# Patient Record
Sex: FEMALE | Race: WHITE | Employment: UNEMPLOYED | URBAN - METROPOLITAN AREA
[De-identification: names, ages, dates, MRNs, and addresses within clinical notes are randomized per-mention and may not be internally consistent; named-entity substitution may affect disease eponyms.]

---

## 2019-03-04 PROBLEM — E66.01 SEVERE OBESITY (HCC): Status: ACTIVE | Noted: 2019-03-04

## 2019-05-14 ENCOUNTER — HOSPITAL ENCOUNTER (OUTPATIENT)
Dept: GENERAL RADIOLOGY | Age: 49
Discharge: HOME | End: 2019-05-14
Payer: MEDICARE

## 2019-05-14 DIAGNOSIS — M06.4 INFLAMMATORY POLYARTHRITIS (HCC): ICD-10-CM

## 2019-05-14 DIAGNOSIS — L40.9 PSORIASIS: ICD-10-CM

## 2019-05-14 DIAGNOSIS — Z79.52 LONG TERM (CURRENT) USE OF SYSTEMIC STEROIDS: ICD-10-CM

## 2019-05-14 DIAGNOSIS — L40.50 PSORIATIC ARTHRITIS (HCC): ICD-10-CM

## 2019-05-14 DIAGNOSIS — E55.9 HYPOVITAMINOSIS D: ICD-10-CM

## 2019-05-14 DIAGNOSIS — Z79.899 HIGH RISK MEDICATION USE: ICD-10-CM

## 2019-05-14 DIAGNOSIS — R53.83 FATIGUE, UNSPECIFIED TYPE: ICD-10-CM

## 2019-05-14 PROCEDURE — 73130 X-RAY EXAM OF HAND: CPT

## 2019-05-14 PROCEDURE — 73100 X-RAY EXAM OF WRIST: CPT

## 2019-09-03 PROBLEM — J44.9 CHRONIC OBSTRUCTIVE PULMONARY DISEASE (HCC): Status: ACTIVE | Noted: 2019-09-03

## 2019-09-03 PROBLEM — R21 RASH AND NONSPECIFIC SKIN ERUPTION: Status: ACTIVE | Noted: 2019-09-03

## 2019-09-03 PROBLEM — K42.9 RECURRENT UMBILICAL HERNIA: Status: ACTIVE | Noted: 2019-09-03

## 2019-09-05 PROBLEM — K42.9 UMBILICAL HERNIA WITHOUT OBSTRUCTION AND WITHOUT GANGRENE: Status: ACTIVE | Noted: 2019-09-05

## 2019-09-09 RX ORDER — SODIUM CHLORIDE 0.9 % (FLUSH) 0.9 %
5-40 SYRINGE (ML) INJECTION AS NEEDED
Status: CANCELLED | OUTPATIENT
Start: 2019-09-09

## 2019-09-09 RX ORDER — SODIUM CHLORIDE 0.9 % (FLUSH) 0.9 %
5-40 SYRINGE (ML) INJECTION EVERY 8 HOURS
Status: CANCELLED | OUTPATIENT
Start: 2019-09-09

## 2019-09-16 ENCOUNTER — ANESTHESIA EVENT (OUTPATIENT)
Dept: SURGERY | Age: 49
End: 2019-09-16
Payer: MEDICARE

## 2019-09-16 ENCOUNTER — ANESTHESIA (OUTPATIENT)
Dept: SURGERY | Age: 49
End: 2019-09-16
Payer: MEDICARE

## 2019-09-16 ENCOUNTER — HOSPITAL ENCOUNTER (OUTPATIENT)
Age: 49
Setting detail: OUTPATIENT SURGERY
Discharge: HOME | End: 2019-09-16
Attending: SURGERY | Admitting: SURGERY
Payer: MEDICARE

## 2019-09-16 VITALS
RESPIRATION RATE: 16 BRPM | TEMPERATURE: 98.4 F | DIASTOLIC BLOOD PRESSURE: 55 MMHG | WEIGHT: 196.2 LBS | HEIGHT: 63 IN | HEART RATE: 66 BPM | BODY MASS INDEX: 34.76 KG/M2 | SYSTOLIC BLOOD PRESSURE: 98 MMHG | OXYGEN SATURATION: 98 %

## 2019-09-16 LAB — HGB BLD-MCNC: 14.1 G/DL (ref 11.7–15.4)

## 2019-09-16 PROCEDURE — 76060000032 HC ANESTHESIA 0.5 TO 1 HR: Performed by: SURGERY

## 2019-09-16 PROCEDURE — 74011250637 HC RX REV CODE- 250/637: Performed by: ANESTHESIOLOGY

## 2019-09-16 PROCEDURE — 74011000250 HC RX REV CODE- 250

## 2019-09-16 PROCEDURE — 77030018836 HC SOL IRR NACL ICUM -A: Performed by: SURGERY

## 2019-09-16 PROCEDURE — 77030039425 HC BLD LARYNG TRULITE DISP TELE -A: Performed by: REGISTERED NURSE

## 2019-09-16 PROCEDURE — 76210000020 HC REC RM PH II FIRST 0.5 HR: Performed by: SURGERY

## 2019-09-16 PROCEDURE — 74011250636 HC RX REV CODE- 250/636: Performed by: ANESTHESIOLOGY

## 2019-09-16 PROCEDURE — 85018 HEMOGLOBIN: CPT

## 2019-09-16 PROCEDURE — 74011000250 HC RX REV CODE- 250: Performed by: SURGERY

## 2019-09-16 PROCEDURE — 77030019940 HC BLNKT HYPOTHRM STRY -B: Performed by: REGISTERED NURSE

## 2019-09-16 PROCEDURE — 74011250636 HC RX REV CODE- 250/636

## 2019-09-16 PROCEDURE — 77030002933 HC SUT MCRYL J&J -A: Performed by: SURGERY

## 2019-09-16 PROCEDURE — 74011250636 HC RX REV CODE- 250/636: Performed by: SURGERY

## 2019-09-16 PROCEDURE — 77030037088 HC TUBE ENDOTRACH ORAL NSL COVD-A: Performed by: REGISTERED NURSE

## 2019-09-16 PROCEDURE — 77030031139 HC SUT VCRL2 J&J -A: Performed by: SURGERY

## 2019-09-16 PROCEDURE — 76010000160 HC OR TIME 0.5 TO 1 HR INTENSV-TIER 1: Performed by: SURGERY

## 2019-09-16 PROCEDURE — 76210000063 HC OR PH I REC FIRST 0.5 HR: Performed by: SURGERY

## 2019-09-16 PROCEDURE — 77030039266 HC ADH SKN EXOFIN S2SG -A: Performed by: SURGERY

## 2019-09-16 PROCEDURE — 77030033269 HC SLV COMPR SCD KNE2 CARD -B: Performed by: SURGERY

## 2019-09-16 PROCEDURE — 74011250637 HC RX REV CODE- 250/637

## 2019-09-16 PROCEDURE — 77030002966 HC SUT PDS J&J -A: Performed by: SURGERY

## 2019-09-16 PROCEDURE — C1781 MESH (IMPLANTABLE): HCPCS | Performed by: SURGERY

## 2019-09-16 DEVICE — BARD VENTRALEX HERNIA PATCH, 4.3 CM (1.7"), SMALL CIRCLE WITH STRAP
Type: IMPLANTABLE DEVICE | Site: UMBILICAL | Status: FUNCTIONAL
Brand: VENTRALEX

## 2019-09-16 RX ORDER — PROPOFOL 10 MG/ML
INJECTION, EMULSION INTRAVENOUS AS NEEDED
Status: DISCONTINUED | OUTPATIENT
Start: 2019-09-16 | End: 2019-09-16 | Stop reason: HOSPADM

## 2019-09-16 RX ORDER — NALOXONE HYDROCHLORIDE 0.4 MG/ML
0.1 INJECTION, SOLUTION INTRAMUSCULAR; INTRAVENOUS; SUBCUTANEOUS
Status: DISCONTINUED | OUTPATIENT
Start: 2019-09-16 | End: 2019-09-16 | Stop reason: HOSPADM

## 2019-09-16 RX ORDER — HYDROMORPHONE HYDROCHLORIDE 2 MG/ML
0.5 INJECTION, SOLUTION INTRAMUSCULAR; INTRAVENOUS; SUBCUTANEOUS
Status: DISCONTINUED | OUTPATIENT
Start: 2019-09-16 | End: 2019-09-16 | Stop reason: HOSPADM

## 2019-09-16 RX ORDER — CEFAZOLIN SODIUM/WATER 2 G/20 ML
2 SYRINGE (ML) INTRAVENOUS ONCE
Status: COMPLETED | OUTPATIENT
Start: 2019-09-16 | End: 2019-09-16

## 2019-09-16 RX ORDER — DEXAMETHASONE SODIUM PHOSPHATE 4 MG/ML
INJECTION, SOLUTION INTRA-ARTICULAR; INTRALESIONAL; INTRAMUSCULAR; INTRAVENOUS; SOFT TISSUE AS NEEDED
Status: DISCONTINUED | OUTPATIENT
Start: 2019-09-16 | End: 2019-09-16 | Stop reason: HOSPADM

## 2019-09-16 RX ORDER — ROCURONIUM BROMIDE 10 MG/ML
INJECTION, SOLUTION INTRAVENOUS AS NEEDED
Status: DISCONTINUED | OUTPATIENT
Start: 2019-09-16 | End: 2019-09-16 | Stop reason: HOSPADM

## 2019-09-16 RX ORDER — OXYCODONE HYDROCHLORIDE 5 MG/1
10 TABLET ORAL
Status: DISCONTINUED | OUTPATIENT
Start: 2019-09-16 | End: 2019-09-16 | Stop reason: HOSPADM

## 2019-09-16 RX ORDER — ALBUTEROL SULFATE 90 UG/1
AEROSOL, METERED RESPIRATORY (INHALATION) AS NEEDED
Status: DISCONTINUED | OUTPATIENT
Start: 2019-09-16 | End: 2019-09-16 | Stop reason: HOSPADM

## 2019-09-16 RX ORDER — SODIUM CHLORIDE, SODIUM LACTATE, POTASSIUM CHLORIDE, CALCIUM CHLORIDE 600; 310; 30; 20 MG/100ML; MG/100ML; MG/100ML; MG/100ML
75 INJECTION, SOLUTION INTRAVENOUS CONTINUOUS
Status: DISCONTINUED | OUTPATIENT
Start: 2019-09-16 | End: 2019-09-16 | Stop reason: HOSPADM

## 2019-09-16 RX ORDER — BUPIVACAINE HYDROCHLORIDE AND EPINEPHRINE 5; 5 MG/ML; UG/ML
INJECTION, SOLUTION EPIDURAL; INTRACAUDAL; PERINEURAL AS NEEDED
Status: DISCONTINUED | OUTPATIENT
Start: 2019-09-16 | End: 2019-09-16 | Stop reason: HOSPADM

## 2019-09-16 RX ORDER — LIDOCAINE HYDROCHLORIDE 10 MG/ML
0.1 INJECTION INFILTRATION; PERINEURAL AS NEEDED
Status: DISCONTINUED | OUTPATIENT
Start: 2019-09-16 | End: 2019-09-16 | Stop reason: HOSPADM

## 2019-09-16 RX ORDER — SODIUM CHLORIDE 0.9 % (FLUSH) 0.9 %
5-40 SYRINGE (ML) INJECTION AS NEEDED
Status: DISCONTINUED | OUTPATIENT
Start: 2019-09-16 | End: 2019-09-16 | Stop reason: HOSPADM

## 2019-09-16 RX ORDER — DIPHENHYDRAMINE HYDROCHLORIDE 50 MG/ML
12.5 INJECTION, SOLUTION INTRAMUSCULAR; INTRAVENOUS
Status: DISCONTINUED | OUTPATIENT
Start: 2019-09-16 | End: 2019-09-16 | Stop reason: HOSPADM

## 2019-09-16 RX ORDER — ACETAMINOPHEN 500 MG
1000 TABLET ORAL ONCE
Status: COMPLETED | OUTPATIENT
Start: 2019-09-16 | End: 2019-09-16

## 2019-09-16 RX ORDER — FENTANYL CITRATE 50 UG/ML
INJECTION, SOLUTION INTRAMUSCULAR; INTRAVENOUS AS NEEDED
Status: DISCONTINUED | OUTPATIENT
Start: 2019-09-16 | End: 2019-09-16 | Stop reason: HOSPADM

## 2019-09-16 RX ORDER — MIDAZOLAM HYDROCHLORIDE 1 MG/ML
2 INJECTION, SOLUTION INTRAMUSCULAR; INTRAVENOUS
Status: COMPLETED | OUTPATIENT
Start: 2019-09-16 | End: 2019-09-16

## 2019-09-16 RX ORDER — NEOSTIGMINE METHYLSULFATE 1 MG/ML
INJECTION, SOLUTION INTRAVENOUS AS NEEDED
Status: DISCONTINUED | OUTPATIENT
Start: 2019-09-16 | End: 2019-09-16 | Stop reason: HOSPADM

## 2019-09-16 RX ORDER — ONDANSETRON 2 MG/ML
INJECTION INTRAMUSCULAR; INTRAVENOUS AS NEEDED
Status: DISCONTINUED | OUTPATIENT
Start: 2019-09-16 | End: 2019-09-16 | Stop reason: HOSPADM

## 2019-09-16 RX ORDER — OXYCODONE HYDROCHLORIDE 5 MG/1
5 TABLET ORAL
Status: COMPLETED | OUTPATIENT
Start: 2019-09-16 | End: 2019-09-16

## 2019-09-16 RX ORDER — GABAPENTIN 300 MG/1
600 CAPSULE ORAL ONCE
Status: DISCONTINUED | OUTPATIENT
Start: 2019-09-16 | End: 2019-09-16

## 2019-09-16 RX ORDER — GLYCOPYRROLATE 0.2 MG/ML
INJECTION INTRAMUSCULAR; INTRAVENOUS AS NEEDED
Status: DISCONTINUED | OUTPATIENT
Start: 2019-09-16 | End: 2019-09-16 | Stop reason: HOSPADM

## 2019-09-16 RX ORDER — FLUMAZENIL 0.1 MG/ML
0.2 INJECTION INTRAVENOUS AS NEEDED
Status: DISCONTINUED | OUTPATIENT
Start: 2019-09-16 | End: 2019-09-16 | Stop reason: HOSPADM

## 2019-09-16 RX ORDER — SODIUM CHLORIDE 0.9 % (FLUSH) 0.9 %
5-40 SYRINGE (ML) INJECTION EVERY 8 HOURS
Status: DISCONTINUED | OUTPATIENT
Start: 2019-09-16 | End: 2019-09-16 | Stop reason: HOSPADM

## 2019-09-16 RX ORDER — LIDOCAINE HYDROCHLORIDE 20 MG/ML
INJECTION, SOLUTION EPIDURAL; INFILTRATION; INTRACAUDAL; PERINEURAL AS NEEDED
Status: DISCONTINUED | OUTPATIENT
Start: 2019-09-16 | End: 2019-09-16 | Stop reason: HOSPADM

## 2019-09-16 RX ADMIN — OXYCODONE HYDROCHLORIDE 5 MG: 5 TABLET ORAL at 13:05

## 2019-09-16 RX ADMIN — NEOSTIGMINE METHYLSULFATE 4 MG: 1 INJECTION, SOLUTION INTRAVENOUS at 12:42

## 2019-09-16 RX ADMIN — DEXAMETHASONE SODIUM PHOSPHATE 10 MG: 4 INJECTION, SOLUTION INTRA-ARTICULAR; INTRALESIONAL; INTRAMUSCULAR; INTRAVENOUS; SOFT TISSUE at 12:21

## 2019-09-16 RX ADMIN — ONDANSETRON 4 MG: 2 INJECTION INTRAMUSCULAR; INTRAVENOUS at 12:21

## 2019-09-16 RX ADMIN — ACETAMINOPHEN 1000 MG: 500 TABLET, FILM COATED ORAL at 12:01

## 2019-09-16 RX ADMIN — MIDAZOLAM HYDROCHLORIDE 2 MG: 2 INJECTION, SOLUTION INTRAMUSCULAR; INTRAVENOUS at 12:01

## 2019-09-16 RX ADMIN — Medication 2 G: at 12:21

## 2019-09-16 RX ADMIN — ALBUTEROL SULFATE 6 PUFF: 90 AEROSOL, METERED RESPIRATORY (INHALATION) at 12:45

## 2019-09-16 RX ADMIN — ROCURONIUM BROMIDE 40 MG: 10 INJECTION, SOLUTION INTRAVENOUS at 12:12

## 2019-09-16 RX ADMIN — SODIUM CHLORIDE, SODIUM LACTATE, POTASSIUM CHLORIDE, AND CALCIUM CHLORIDE 75 ML/HR: 600; 310; 30; 20 INJECTION, SOLUTION INTRAVENOUS at 10:44

## 2019-09-16 RX ADMIN — GLYCOPYRROLATE 0.6 MG: 0.2 INJECTION INTRAMUSCULAR; INTRAVENOUS at 12:42

## 2019-09-16 RX ADMIN — FENTANYL CITRATE 100 MCG: 50 INJECTION, SOLUTION INTRAMUSCULAR; INTRAVENOUS at 12:12

## 2019-09-16 RX ADMIN — PROPOFOL 200 MG: 10 INJECTION, EMULSION INTRAVENOUS at 12:12

## 2019-09-16 RX ADMIN — LIDOCAINE HYDROCHLORIDE 80 MG: 20 INJECTION, SOLUTION EPIDURAL; INFILTRATION; INTRACAUDAL; PERINEURAL at 12:12

## 2019-09-16 NOTE — ANESTHESIA POSTPROCEDURE EVALUATION
Procedure(s): HERNIA UMBILICAL REPAIR WITH MESH. general    Anesthesia Post Evaluation      Multimodal analgesia: multimodal analgesia used between 6 hours prior to anesthesia start to PACU discharge  Patient location during evaluation: PACU  Patient participation: complete - patient participated  Level of consciousness: awake  Pain management: adequate  Airway patency: patent  Anesthetic complications: no  Cardiovascular status: acceptable and hemodynamically stable  Respiratory status: acceptable  Hydration status: acceptable  Comments: Acceptable for discharge from PACU.   Post anesthesia nausea and vomiting:  none      Vitals Value Taken Time   BP 98/55 9/16/2019  1:17 PM   Temp 36.9 °C (98.4 °F) 9/16/2019  1:12 PM   Pulse 66 9/16/2019  1:17 PM   Resp 16 9/16/2019  1:17 PM   SpO2 98 % 9/16/2019  1:17 PM

## 2019-09-16 NOTE — DISCHARGE INSTRUCTIONS
Discharge Instructions/Follow-up Plans:   MD Instructions:     Follow-up with Juvenal Piper, Physician Assistant for Dr. Elinor Claire in 1 week. Keep incisions clean and dry, may remain uncovered. Do not apply lotions, creams or ointments to incisions.     Diet - as tolerated - Soft foods diet. Activity - ambulate - as tolerated - no heavy lifting >10lb. May shower - no tub baths or soaking/submerging.     Resume the Norco that you were taking prior to surgery. You may add Ibuprofen 800mg three times daily with food for pain. No driving while taking narcotics. Do not drink alcohol while taking narcotics. Resume other home medications.      If problems or questions arise, please call our office at [phone].     Greater than 30 minutes were spent discharging the patient     After general anesthesia or intravenous sedation, for 24 hours or while taking prescription Narcotics:  · Limit your activities  · A responsible adult needs to be with you for the next 24 hours  · Do not drive and operate hazardous machinery  · Do not make important personal or business decisions  · Do  not drink alcoholic beverages  · If you have not urinated within 8 hours after discharge, please contact your surgeon on call. · If you have sleep apnea and have a CPAP machine, please use it for all naps and sleeping. · Please use caution when taking narcotics and any of your home medications that may cause drowsiness. *  Please give a list of your current medications to your Primary Care Provider. *  Please update this list whenever your medications are discontinued, doses are      changed, or new medications (including over-the-counter products) are added. *  Please carry medication information at all times in case of emergency situations.     These are general instructions for a healthy lifestyle:  No smoking/ No tobacco products/ Avoid exposure to second hand smoke  Surgeon General's Warning:  Quitting smoking now greatly reduces serious risk to your health. Obesity, smoking, and sedentary lifestyle greatly increases your risk for illness  A healthy diet, regular physical exercise & weight monitoring are important for maintaining a healthy lifestyle    You may be retaining fluid if you have a history of heart failure or if you experience any of the following symptoms:  Weight gain of 3 pounds or more overnight or 5 pounds in a week, increased swelling in our hands or feet or shortness of breath while lying flat in bed.   Please call your doctor as soon as you notice any of these symptoms; do not wait until your next office visit.

## 2019-09-16 NOTE — ANESTHESIA PREPROCEDURE EVALUATION
Relevant Problems   No relevant active problems       Anesthetic History   No history of anesthetic complications            Review of Systems / Medical History  Patient summary reviewed and pertinent labs reviewed    Pulmonary    COPD: moderate      Smoker         Neuro/Psych             Comments: Fibromyalgia Cardiovascular              Hyperlipidemia    Exercise tolerance: >4 METS     GI/Hepatic/Renal  Within defined limits              Endo/Other        Obesity and arthritis     Other Findings              Physical Exam    Airway  Mallampati: II  TM Distance: 4 - 6 cm  Neck ROM: normal range of motion   Mouth opening: Normal     Cardiovascular    Rhythm: regular  Rate: normal         Dental  No notable dental hx       Pulmonary  Breath sounds clear to auscultation               Abdominal         Other Findings            Anesthetic Plan    ASA: 2  Anesthesia type: general            Anesthetic plan and risks discussed with: Patient and Family

## 2019-09-16 NOTE — BRIEF OP NOTE
BRIEF OPERATIVE NOTE    Date of Procedure: 9/16/2019   Preoperative Diagnosis: Umbilical hernia without obstruction and without gangrene [K42.9]  Postoperative Diagnosis: Umbilical hernia without obstruction and without gangrene [K42.9]    Procedure(s): HERNIA UMBILICAL REPAIR WITH MESH  Surgeon(s) and Role:     * Lissa Chahal MD - Primary         Surgical Assistant: BOB Keene      Surgical Staff:  Circ-1: Elvira Sherman RN  Circ-Relief: Zaira Manrique RN  Physician Assistant: BOB Bennett  Scrub Tech-1: Izabel Early  Event Time In Time Out   Incision Start 1226    Incision Close [phone]      Anesthesia: General   Estimated Blood Loss: minimal  Specimens: * No specimens in log *   Findings: see operative report   Complications: none  Implants:   Implant Name Type Inv.  Item Serial No.  Lot No. LRB No. Used Action   MESH KITTY CIR SM 4.3CM PTFE --  - [id number]  MESH KITTY CIR SM 4.3CM PTFE --   BARD KING [id number] N/A 1 Implanted

## 2019-09-17 NOTE — OP NOTES
[facility]  OPERATIVE REPORT    Name:  Ramon Jaeger  MR#:  [id number]  :  1970  ACCOUNT #:  [de-identified]  DATE OF SERVICE:  2019    PREOPERATIVE DIAGNOSIS:  Umbilical hernia. POSTOPERATIVE DIAGNOSIS:  Umbilical hernia. PROCEDURE PERFORMED:  Umbilical hernia repair with mesh. SURGEON:  Adri Minaya MD    ASSISTANT:  Cristian Ron    ANESTHESIA:  General.    COMPLICATIONS:  None. COUNTS:  Correct. SPECIMENS REMOVED:  None. IMPLANTS:  One small [place] with a strap. ESTIMATED BLOOD LOSS:  Minimal.    OPERATIVE NOTE IN DETAIL:  After the patient was asleep, the abdomen was prepped and draped in the usual sterile fashion. An incision was made around the umbilicus, dissection was carried down. The umbilicus was freed from the base of the fascia and also from the hernia sac. The hernia sac was reduced completely. A Ventralex hernia patch was placed in the defect and interrupted 2-0 Prolene sutures were used to secure this. The base of the umbilicus was then tacked down to the fascia with a 3-0 Vicryl suture, 4-0 subcuticular Monocryl and Dermabond was used to close the skin. A sterile dressing was applied. The patient tolerated the procedure well. BOB Ron was scrubbed and present throughout the entire procedure. She assisted with retraction and exposure of the hernia as well as closure.       Joseph Ward MD      [id number]  D:  2019 12:42  T:  2019 13:41  JOB #:  [id number]

## 2019-09-23 PROBLEM — Z09 S/P UMBILICAL HERNIA REPAIR, FOLLOW-UP EXAM: Status: ACTIVE | Noted: 2019-09-23

## 2020-03-04 PROBLEM — Z71.6 ENCOUNTER FOR SMOKING CESSATION COUNSELING: Status: ACTIVE | Noted: 2020-03-04

## 2020-09-28 ENCOUNTER — HOSPITAL ENCOUNTER (OUTPATIENT)
Dept: MAMMOGRAPHY | Age: 50
Discharge: HOME | End: 2020-09-28
Attending: FAMILY MEDICINE
Payer: MEDICARE

## 2020-09-28 DIAGNOSIS — Z12.31 OTHER SCREENING MAMMOGRAM: ICD-10-CM

## 2020-09-28 PROCEDURE — 77067 SCR MAMMO BI INCL CAD: CPT

## 2020-11-19 RX ORDER — SODIUM CHLORIDE 0.9 % (FLUSH) 0.9 %
5-40 SYRINGE (ML) INJECTION AS NEEDED
Status: CANCELLED | OUTPATIENT
Start: 2020-11-19

## 2020-11-19 RX ORDER — SODIUM CHLORIDE 0.9 % (FLUSH) 0.9 %
5-40 SYRINGE (ML) INJECTION EVERY 8 HOURS
Status: CANCELLED | OUTPATIENT
Start: 2020-11-19

## 2020-12-09 ENCOUNTER — HOSPITAL ENCOUNTER (OUTPATIENT)
Dept: LAB | Age: 50
Discharge: HOME | End: 2020-12-09
Payer: MEDICARE

## 2020-12-09 LAB — POTASSIUM SERPL-SCNC: 4.3 MMOL/L (ref 3.5–5.1)

## 2020-12-09 PROCEDURE — 84132 ASSAY OF SERUM POTASSIUM: CPT

## 2020-12-09 PROCEDURE — 36415 COLL VENOUS BLD VENIPUNCTURE: CPT

## 2020-12-14 ENCOUNTER — ANESTHESIA EVENT (OUTPATIENT)
Dept: SURGERY | Age: 50
End: 2020-12-14
Payer: MEDICARE

## 2020-12-14 ENCOUNTER — HOSPITAL ENCOUNTER (OUTPATIENT)
Age: 50
Setting detail: OUTPATIENT SURGERY
Discharge: HOME | End: 2020-12-14
Attending: ORTHOPAEDIC SURGERY | Admitting: ORTHOPAEDIC SURGERY
Payer: MEDICARE

## 2020-12-14 ENCOUNTER — ANESTHESIA (OUTPATIENT)
Dept: SURGERY | Age: 50
End: 2020-12-14
Payer: MEDICARE

## 2020-12-14 VITALS
HEART RATE: 85 BPM | TEMPERATURE: 98.4 F | WEIGHT: 200 LBS | SYSTOLIC BLOOD PRESSURE: 123 MMHG | BODY MASS INDEX: 35.44 KG/M2 | OXYGEN SATURATION: 95 % | DIASTOLIC BLOOD PRESSURE: 58 MMHG | HEIGHT: 63 IN | RESPIRATION RATE: 14 BRPM

## 2020-12-14 LAB
POTASSIUM BLD-SCNC: 3.9 MMOL/L (ref 3.5–5.1)
POTASSIUM BLD-SCNC: 7.2 MMOL/L (ref 3.5–5.1)
POTASSIUM BLD-SCNC: 7.3 MMOL/L (ref 3.5–5.1)

## 2020-12-14 PROCEDURE — 76210000020 HC REC RM PH II FIRST 0.5 HR: Performed by: ORTHOPAEDIC SURGERY

## 2020-12-14 PROCEDURE — 77030031139 HC SUT VCRL2 J&J -A: Performed by: ORTHOPAEDIC SURGERY

## 2020-12-14 PROCEDURE — 74011250636 HC RX REV CODE- 250/636: Performed by: ANESTHESIOLOGY

## 2020-12-14 PROCEDURE — 77030002933 HC SUT MCRYL J&J -A: Performed by: ORTHOPAEDIC SURGERY

## 2020-12-14 PROCEDURE — 77030040922 HC BLNKT HYPOTHRM STRY -A: Performed by: ANESTHESIOLOGY

## 2020-12-14 PROCEDURE — C1713 ANCHOR/SCREW BN/BN,TIS/BN: HCPCS | Performed by: ORTHOPAEDIC SURGERY

## 2020-12-14 PROCEDURE — 74011250636 HC RX REV CODE- 250/636: Performed by: NURSE ANESTHETIST, CERTIFIED REGISTERED

## 2020-12-14 PROCEDURE — 77030003602 HC NDL NRV BLK BBMI -B: Performed by: ANESTHESIOLOGY

## 2020-12-14 PROCEDURE — 77030010507 HC ADH SKN DERMBND J&J -B: Performed by: ORTHOPAEDIC SURGERY

## 2020-12-14 PROCEDURE — 77030003690 HC NDL TAPR ASPN -A: Performed by: ORTHOPAEDIC SURGERY

## 2020-12-14 PROCEDURE — 77030033138 HC SUT PGA STRATFX J&J -B: Performed by: ORTHOPAEDIC SURGERY

## 2020-12-14 PROCEDURE — 77030012711 HC WND ARTHRO ABLT S&N -D: Performed by: ORTHOPAEDIC SURGERY

## 2020-12-14 PROCEDURE — 77030020753 HC CUF TRNQT 1BLA STRY -B: Performed by: ORTHOPAEDIC SURGERY

## 2020-12-14 PROCEDURE — 74011000250 HC RX REV CODE- 250: Performed by: NURSE ANESTHETIST, CERTIFIED REGISTERED

## 2020-12-14 PROCEDURE — 84132 ASSAY OF SERUM POTASSIUM: CPT

## 2020-12-14 PROCEDURE — 76010000160 HC OR TIME 0.5 TO 1 HR INTENSV-TIER 1: Performed by: ORTHOPAEDIC SURGERY

## 2020-12-14 PROCEDURE — 77030040116 HC KIT SURG BIT GD Q-FX DISP S&N -C: Performed by: ORTHOPAEDIC SURGERY

## 2020-12-14 PROCEDURE — 77030010509 HC AIRWY LMA MSK TELE -A: Performed by: ANESTHESIOLOGY

## 2020-12-14 PROCEDURE — 76010010054 HC POST OP PAIN BLOCK: Performed by: ORTHOPAEDIC SURGERY

## 2020-12-14 PROCEDURE — 77030018823 HC SLV COMPR VENO -B: Performed by: ORTHOPAEDIC SURGERY

## 2020-12-14 PROCEDURE — 76060000032 HC ANESTHESIA 0.5 TO 1 HR: Performed by: ORTHOPAEDIC SURGERY

## 2020-12-14 PROCEDURE — 74011250636 HC RX REV CODE- 250/636

## 2020-12-14 PROCEDURE — 74011250637 HC RX REV CODE- 250/637: Performed by: ANESTHESIOLOGY

## 2020-12-14 PROCEDURE — 2709999900 HC NON-CHARGEABLE SUPPLY: Performed by: ORTHOPAEDIC SURGERY

## 2020-12-14 PROCEDURE — 76210000063 HC OR PH I REC FIRST 0.5 HR: Performed by: ORTHOPAEDIC SURGERY

## 2020-12-14 PROCEDURE — 76942 ECHO GUIDE FOR BIOPSY: CPT | Performed by: ORTHOPAEDIC SURGERY

## 2020-12-14 DEVICE — 1.8MM Q-FIX ALL SUTURE ANCHOR
Type: IMPLANTABLE DEVICE | Site: ELBOW | Status: FUNCTIONAL
Brand: Q-FIX

## 2020-12-14 RX ORDER — PROPOFOL 10 MG/ML
INJECTION, EMULSION INTRAVENOUS
Status: DISCONTINUED | OUTPATIENT
Start: 2020-12-14 | End: 2020-12-14 | Stop reason: HOSPADM

## 2020-12-14 RX ORDER — SODIUM CHLORIDE 0.9 % (FLUSH) 0.9 %
5-40 SYRINGE (ML) INJECTION EVERY 8 HOURS
Status: DISCONTINUED | OUTPATIENT
Start: 2020-12-14 | End: 2020-12-14 | Stop reason: HOSPADM

## 2020-12-14 RX ORDER — ROPIVACAINE HYDROCHLORIDE 5 MG/ML
INJECTION, SOLUTION EPIDURAL; INFILTRATION; PERINEURAL
Status: COMPLETED | OUTPATIENT
Start: 2020-12-14 | End: 2020-12-14

## 2020-12-14 RX ORDER — ONDANSETRON 2 MG/ML
INJECTION INTRAMUSCULAR; INTRAVENOUS AS NEEDED
Status: DISCONTINUED | OUTPATIENT
Start: 2020-12-14 | End: 2020-12-14 | Stop reason: HOSPADM

## 2020-12-14 RX ORDER — OXYCODONE AND ACETAMINOPHEN 10; 325 MG/1; MG/1
1 TABLET ORAL AS NEEDED
Status: DISCONTINUED | OUTPATIENT
Start: 2020-12-14 | End: 2020-12-14 | Stop reason: HOSPADM

## 2020-12-14 RX ORDER — LIDOCAINE HYDROCHLORIDE 20 MG/ML
INJECTION, SOLUTION EPIDURAL; INFILTRATION; INTRACAUDAL; PERINEURAL AS NEEDED
Status: DISCONTINUED | OUTPATIENT
Start: 2020-12-14 | End: 2020-12-14 | Stop reason: HOSPADM

## 2020-12-14 RX ORDER — PROPOFOL 10 MG/ML
INJECTION, EMULSION INTRAVENOUS AS NEEDED
Status: DISCONTINUED | OUTPATIENT
Start: 2020-12-14 | End: 2020-12-14 | Stop reason: HOSPADM

## 2020-12-14 RX ORDER — ACETAMINOPHEN 500 MG
1000 TABLET ORAL ONCE
Status: COMPLETED | OUTPATIENT
Start: 2020-12-14 | End: 2020-12-14

## 2020-12-14 RX ORDER — SODIUM CHLORIDE, SODIUM LACTATE, POTASSIUM CHLORIDE, CALCIUM CHLORIDE 600; 310; 30; 20 MG/100ML; MG/100ML; MG/100ML; MG/100ML
75 INJECTION, SOLUTION INTRAVENOUS CONTINUOUS
Status: DISCONTINUED | OUTPATIENT
Start: 2020-12-14 | End: 2020-12-14 | Stop reason: HOSPADM

## 2020-12-14 RX ORDER — FENTANYL CITRATE 50 UG/ML
INJECTION, SOLUTION INTRAMUSCULAR; INTRAVENOUS AS NEEDED
Status: DISCONTINUED | OUTPATIENT
Start: 2020-12-14 | End: 2020-12-14

## 2020-12-14 RX ORDER — CEFAZOLIN SODIUM/WATER 2 G/20 ML
2 SYRINGE (ML) INTRAVENOUS ONCE
Status: COMPLETED | OUTPATIENT
Start: 2020-12-14 | End: 2020-12-14

## 2020-12-14 RX ORDER — MIDAZOLAM HYDROCHLORIDE 1 MG/ML
2 INJECTION, SOLUTION INTRAMUSCULAR; INTRAVENOUS ONCE
Status: COMPLETED | OUTPATIENT
Start: 2020-12-14 | End: 2020-12-14

## 2020-12-14 RX ORDER — HYDROMORPHONE HYDROCHLORIDE 2 MG/ML
0.5 INJECTION, SOLUTION INTRAMUSCULAR; INTRAVENOUS; SUBCUTANEOUS
Status: DISCONTINUED | OUTPATIENT
Start: 2020-12-14 | End: 2020-12-14 | Stop reason: HOSPADM

## 2020-12-14 RX ORDER — OXYCODONE HYDROCHLORIDE 5 MG/1
5 TABLET ORAL
Status: DISCONTINUED | OUTPATIENT
Start: 2020-12-14 | End: 2020-12-14 | Stop reason: HOSPADM

## 2020-12-14 RX ORDER — FENTANYL CITRATE 50 UG/ML
100 INJECTION, SOLUTION INTRAMUSCULAR; INTRAVENOUS ONCE
Status: COMPLETED | OUTPATIENT
Start: 2020-12-14 | End: 2020-12-14

## 2020-12-14 RX ORDER — DEXAMETHASONE SODIUM PHOSPHATE 4 MG/ML
INJECTION, SOLUTION INTRA-ARTICULAR; INTRALESIONAL; INTRAMUSCULAR; INTRAVENOUS; SOFT TISSUE
Status: COMPLETED | OUTPATIENT
Start: 2020-12-14 | End: 2020-12-14

## 2020-12-14 RX ORDER — DEXAMETHASONE SODIUM PHOSPHATE 4 MG/ML
INJECTION, SOLUTION INTRA-ARTICULAR; INTRALESIONAL; INTRAMUSCULAR; INTRAVENOUS; SOFT TISSUE AS NEEDED
Status: DISCONTINUED | OUTPATIENT
Start: 2020-12-14 | End: 2020-12-14 | Stop reason: HOSPADM

## 2020-12-14 RX ORDER — SODIUM CHLORIDE 0.9 % (FLUSH) 0.9 %
5-40 SYRINGE (ML) INJECTION AS NEEDED
Status: DISCONTINUED | OUTPATIENT
Start: 2020-12-14 | End: 2020-12-14 | Stop reason: HOSPADM

## 2020-12-14 RX ADMIN — ONDANSETRON 4 MG: 2 INJECTION INTRAMUSCULAR; INTRAVENOUS at 10:48

## 2020-12-14 RX ADMIN — ACETAMINOPHEN 1000 MG: 500 TABLET, FILM COATED ORAL at 09:09

## 2020-12-14 RX ADMIN — SODIUM CHLORIDE, SODIUM LACTATE, POTASSIUM CHLORIDE, AND CALCIUM CHLORIDE 75 ML/HR: 600; 310; 30; 20 INJECTION, SOLUTION INTRAVENOUS at 08:55

## 2020-12-14 RX ADMIN — Medication 2 G: at 10:07

## 2020-12-14 RX ADMIN — ROPIVACAINE HYDROCHLORIDE 40 ML: 150 INJECTION, SOLUTION EPIDURAL; INFILTRATION; PERINEURAL at 09:50

## 2020-12-14 RX ADMIN — LIDOCAINE HYDROCHLORIDE 40 MG: 20 INJECTION, SOLUTION EPIDURAL; INFILTRATION; INTRACAUDAL; PERINEURAL at 10:14

## 2020-12-14 RX ADMIN — FENTANYL CITRATE 100 MCG: 50 INJECTION, SOLUTION INTRAMUSCULAR; INTRAVENOUS at 09:46

## 2020-12-14 RX ADMIN — PROPOFOL 120 MCG/KG/MIN: 10 INJECTION, EMULSION INTRAVENOUS at 10:15

## 2020-12-14 RX ADMIN — PROPOFOL 70 MG: 10 INJECTION, EMULSION INTRAVENOUS at 10:23

## 2020-12-14 RX ADMIN — PROPOFOL 30 MG: 10 INJECTION, EMULSION INTRAVENOUS at 10:18

## 2020-12-14 RX ADMIN — DEXAMETHASONE SODIUM PHOSPHATE 5 MG: 4 INJECTION, SOLUTION INTRAMUSCULAR; INTRAVENOUS at 09:50

## 2020-12-14 RX ADMIN — PROPOFOL 30 MG: 10 INJECTION, EMULSION INTRAVENOUS at 10:14

## 2020-12-14 RX ADMIN — DEXAMETHASONE SODIUM PHOSPHATE 4 MG: 4 INJECTION, SOLUTION INTRAMUSCULAR; INTRAVENOUS at 10:44

## 2020-12-14 RX ADMIN — MIDAZOLAM 2 MG: 1 INJECTION INTRAMUSCULAR; INTRAVENOUS at 09:46

## 2020-12-14 NOTE — DISCHARGE INSTRUCTIONS
Postoperative  Instructions:      Weightbearing or Lifting: You  are  not  allowed  to  lift  any  weight  or  bear  any  weight  on  the  surgical  extremity  until  cleared  by  your  surgeon. Dressing  instructions:    Keep  your  dressing  and/or  splint  clean  and  dry  at  all  times. You  can  remove  your  dressing  on  post-operative  day  #5  and  change  with  a  dry/sterile  dressing  or  Band-Aids  as  needed  thereafter. Showering  Instructions:  May  shower  But keep surgical dressing clean and dry until removed as explained above. After dressing is removed, you may allow soapy water to run through the incision during showers but do not scrub. After each shower, pat dry and apply a dry dressing. Do  not  soak  your  Incision in still water or bathtub  for  3  weeks  after  surgery. If  the  incision  gets  wet otherwise,  pat  dry  and  do  not  scrub  the  incision. Do  not  apply  cream  or  lotion  to  incision      Pain  Control:  - You  have  been  given  a  prescription  to  be  taken  as  directed  for  post-operative  pain  control. In  addition,  elevate  the  operative  extremity  above  the  heart  at  all  times  to  prevent  swelling  and  throbbing  pain. - If you develop constipation while taking narcotic pain medications (Norco, Hydrocodone, Percocet, Oxycodone, Dilaudid, Hydromorphone) take  over-the-counter  Colace,  100mg  by  mouth  twice  a  Day. - Nausea  is  a  common  side  effect  of  many  pain  medications. You  will  want  to  eat something  before  taking  your  pain  medicine  to  help  prevent  Nausea. - If  you  are  taking  a  prescription  pain  medication  that  contains  acetaminophen,  we  recommend  that  you  do  not  take  additional  over  the  counter  acetaminophen  (Tylenol®).       Other  pain  relieving  options:   - Using  a  cold  pack  to  ice  the  affected  area  a  few  times  a  day  (15  to  20  minutes  at  a time)  can  help  to  relieve  pain,  reduce  swelling  and  bruising.      - Elevation  of  the  affected  area  can  also  help  to  reduce  pain  and  swelling. Did  you  receive  a  nerve  Block? A  nerve  block  can  provide  pain  relief  for  one  hour  to  two  days  after  your  surgery. As  long  as  the  nerve  block  is  working,  you  will  experience  little  or  no  sensation  in  the  area  the  surgeon  operated  on. As  the  nerve  block  wears  off,  you  will  begin  to  experience  pain  or  discomfort. It  is  very  important  that  you  begin  taking  your  prescribed  pain  medication  before  the  nerve  block  fully  wears  off. The first sign that the nerve block is wearing off is tingling in your fingers. Treating  your  pain  at  the  first  sign  of  the  block  wearing  off  will  ensure  your  pain  is  better  controlled  and  more  tolerable  when  full-sensation  returns. Do  not  wait  until  the  pain  is  intolerable,  as  the  medicine  will  be  less  effective. It  is  better  to  treat  pain  in  advance  than  to  try  and  catch  up. General  Anesthesia or Sedation:      If  you  did  not  receive  a  nerve  block  during  your  surgery,  you  will  need  to  start  taking  your  pain  medication  shortly  after  your  surgery  and  should  continue  to  do  so  as  prescribed  by  your  surgeon. Please  call  [phone]  with any concern and ask to speak with Kvng Gramajo. Concerning problems include:      -  Excessive  redness  of  the  incisions      -  Drainage  for  more  than  2  Days after surgery or any foul smelling drainage  -  Fever  of  more  than  101.5  F      Please  call  [phone]  if  you  do  not  receive  or  are  unsure  of  your  first  follow-up  appointment. You  should  see  the  doctor  10-14  days  after  your  Surgery. Thank you for choosing me and [address] [place] [address] for your care.  I will go above and beyond to ensure you receive the best care possible. Justin Gilford, MD, PhD        Mignonlynn Gomez Call your doctor if pain is NOT relieved by medication.   Excessive bleeding that does not stop after holding pressure over the area  · Temperature of 101 degrees F or above  · Excessive redness, swelling or bruising, and/ or green or yellow, smelly discharge from incision      TYPICAL SIDE EFFECTS OF PAIN MEDICATION:     Constipation: Drink lots of fluids. Over the counter stool softener if needed.    Nausea: Take pain medication with food. Call your doctor with persistent nausea. ACTIVITY  · As tolerated and as directed by your doctor. · Bathe or shower as directed by your doctor. DIET  · Day of surgery: Clear liquids until no nausea or vomiting; small portion, light diet Hardy foods (ex: baked chicken, plain rice, grits, scrambled eggs, toast). Nothing greasy, fried or spicy today. · Advance to regular diet on second day, unless your doctor orders otherwise. · If nausea and vomiting continues, call your doctor. PAIN  · Take pain medication as directed by your doctor. · DO NOT take aspirin or blood thinners unless directed by your doctor. AFTER ANESTHESIA   · For the first 24 hours: DO NOT Drive, Drink alcoholic beverages, or Make important decisions. · Be aware of dizziness following anesthesia and while taking pain medication. DISCHARGE SUMMARY from Nurse    PATIENT INSTRUCTIONS:    After general anesthesia or intravenous sedation, for 24 hours or while taking prescription Narcotics:  · Limit your activities  · Do not drive and operate hazardous machinery  · Do not make important personal or business decisions  · Do  not drink alcoholic beverages  · If you have not urinated within 8 hours after discharge, please contact your surgeon on call. *  Please give a list of your current medications to your Primary Care Provider.     *  Please update this list whenever your medications are discontinued, doses are      changed, or new medications (including over-the-counter products) are added. *  Please carry medication information at all times in case of emergency situations. Preventing Infection at Home  We care about preventing infection and avoiding the spread of germs - not only when you are in the hospital but also when you return home. When you return home from the hospital, its important to take the following steps to help prevent infection and avoid spreading germs that could infect you and others. Ask everyone in your home to follow these guidelines, too. Clean Your Hands  · Clean your hands whenever your hands are visibly dirty, before you eat, before or after touching your mouth, nose or eyes, and before preparing food. Clean them after contact with body fluids, using the restroom, touching animals or changing diapers. · When washing hands, wet them with warm water and work up a lather. Rub hands for at least 15 seconds, then rinse them and pat them dry with a clean towel or paper towel. · When using hand sanitizers, it should take about 15 seconds to rub your hands dry. If not, you probably didnt apply enough . Cover Your Sneeze or Cough  Germs are released into the air whenever you sneeze or cough. To prevent the spread of infection:  · Turn away from other people before coughing or sneezing. · Cover your mouth or nose with a tissue when you cough or sneeze. Put the tissue in the trash. · If you dont have a tissue, cough or sneeze into your upper sleeve, not your hands. · Always clean your hands after coughing or sneezing. Care for Wounds  Your skin is your bodys first line of defense against germs, but an open wound leaves an easy way for germs to enter your body.  To prevent infection:  · Clean your hands before and after changing wound dressings, and wear gloves to change dressings if recommended by your doctor. · Take special care with IV lines or other devices inserted into the body. If you must touch them, clean your hands first.  · Follow any specific instructions from your doctor to care for your wounds. Contact your doctor if you experience any signs of infection, such as fever or increased redness at the surgical or wound site. Keep a Clean Home  · Clean or wipe commonly touched hard surfaces like door handles, sinks, tabletops, phones and TV remotes. · Use products labeled disinfectant to kill harmful bacteria and viruses. · Use a clean cloth or paper towel to clean and dry surfaces. Wiping surfaces with a dirty dishcloth, sponge or towel will only spread germs. · Never share toothbrushes, ventura, drinking glasses, utensils, razor blades, face cloths or bath towels to avoid spreading germs. · Be sure that the linens that you sleep on are clean. · Keep pets away from wounds and wash your hands after touching pets, their toys or bedding. We care about you and your health. Remember, preventing infections is a team effort between you, your family, friends and health care providers. These are general instructions for a healthy lifestyle:    No smoking/ No tobacco products/ Avoid exposure to second hand smoke    Surgeon General's Warning:  Quitting smoking now greatly reduces serious risk to your health. Obesity, smoking, and sedentary lifestyle greatly increases your risk for illness    A healthy diet, regular physical exercise & weight monitoring are important for maintaining a healthy lifestyle    You may be retaining fluid if you have a history of heart failure or if you experience any of the following symptoms:  Weight gain of 3 pounds or more overnight or 5 pounds in a week, increased swelling in our hands or feet or shortness of breath while lying flat in bed. Please call your doctor as soon as you notice any of these symptoms; do not wait until your next office visit.     Recognize signs and symptoms of STROKE:    F-face looks uneven    A-arms unable to move or move unevenly    S-speech slurred or non-existent    T-time-call 911 as soon as signs and symptoms begin-DO NOT go       Back to bed or wait to see if you get better-TIME IS BRAIN. Advance Care Planning  People with COVID-19 may have no symptoms, mild symptoms, such as fever, cough, and shortness of breath or they may have more severe illness, developing severe and fatal pneumonia. As a result, Advance Care Planning with attention to naming a health care decision maker (someone you trust to make healthcare decisions for you if you could not speak for yourself) and sharing other health care preferences is important BEFORE a possible health crisis. Please contact your Primary Care Provider to discuss Advance Care Planning. Preventing the Spread of Coronavirus Disease 2019 in Homes and Residential Communities  For the most recent information go to [email or web address]    Prevention steps for People with confirmed or suspected COVID-19 (including persons under investigation) who do not need to be hospitalized  and   People with confirmed COVID-19 who were hospitalized and determined to be medically stable to go home    Your healthcare provider and public health staff will evaluate whether you can be cared for at home. If it is determined that you do not need to be hospitalized and can be isolated at home, you will be monitored by staff from your local or state health department. You should follow the prevention steps below until a healthcare provider or local or state health department says you can return to your normal activities. Stay home except to get medical care  People who are mildly ill with COVID-19 are able to isolate at home during their illness. You should restrict activities outside your home, except for getting medical care.  Do not go to work, school, or public areas. Avoid using public transportation, ride-sharing, or taxis. Separate yourself from other people and animals in your home  People: As much as possible, you should stay in a specific room and away from other people in your home. Also, you should use a separate bathroom, if available. Animals: You should restrict contact with pets and other animals while you are sick with COVID-19, just like you would around other people. Although there have not been reports of pets or other animals becoming sick with COVID-19, it is still recommended that people sick with COVID-19 limit contact with animals until more information is known about the virus. When possible, have another member of your household care for your animals while you are sick. If you are sick with COVID-19, avoid contact with your pet, including petting, snuggling, being kissed or licked, and sharing food. If you must care for your pet or be around animals while you are sick, wash your hands before and after you interact with pets and wear a facemask. Call ahead before visiting your doctor  If you have a medical appointment, call the healthcare provider and tell them that you have or may have COVID-19. This will help the healthcare providers office take steps to keep other people from getting infected or exposed. Wear a facemask  You should wear a facemask when you are around other people (e.g., sharing a room or vehicle) or pets and before you enter a healthcare providers office. If you are not able to wear a facemask (for example, because it causes trouble breathing), then people who live with you should not stay in the same room with you, or they should wear a facemask if they enter your room. Cover your coughs and sneezes  Cover your mouth and nose with a tissue when you cough or sneeze. Throw used tissues in a lined trash can.  Immediately wash your hands with soap and water for at least 20 seconds or, if soap and water are not available, clean your hands with an alcohol-based hand  that contains at least 60% alcohol. Clean your hands often  Wash your hands often with soap and water for at least 20 seconds, especially after blowing your nose, coughing, or sneezing; going to the bathroom; and before eating or preparing food. If soap and water are not readily available, use an alcohol-based hand  with at least 60% alcohol, covering all surfaces of your hands and rubbing them together until they feel dry. Soap and water are the best option if hands are visibly dirty. Avoid touching your eyes, nose, and mouth with unwashed hands. Avoid sharing personal household items  You should not share dishes, drinking glasses, cups, eating utensils, towels, or bedding with other people or pets in your home. After using these items, they should be washed thoroughly with soap and water. Clean all high-touch surfaces everyday  High touch surfaces include counters, tabletops, doorknobs, bathroom fixtures, toilets, phones, keyboards, tablets, and bedside tables. Also, clean any surfaces that may have blood, stool, or body fluids on them. Use a household cleaning spray or wipe, according to the label instructions. Labels contain instructions for safe and effective use of the cleaning product including precautions you should take when applying the product, such as wearing gloves and making sure you have good ventilation during use of the product. Monitor your symptoms  Seek prompt medical attention if your illness is worsening (e.g., difficulty breathing). Before seeking care, call your healthcare provider and tell them that you have, or are being evaluated for, COVID-19. Put on a facemask before you enter the facility. These steps will help the healthcare providers office to keep other people in the office or waiting room from getting infected or exposed. Ask your healthcare provider to call the local or state health department.  Persons who are placed under active monitoring or facilitated self-monitoring should follow instructions provided by their local health department or occupational health professionals, as appropriate. When working with your local health department check their available hours. If you have a medical emergency and need to call 911, notify the dispatch personnel that you have, or are being evaluated for COVID-19. If possible, put on a facemask before emergency medical services arrive. Discontinuing home isolation  Patients with confirmed COVID-19 should remain under home isolation precautions until the risk of secondary transmission to others is thought to be low. The decision to discontinue home isolation precautions should be made on a case-by-case basis, in consultation with healthcare providers and [facility] and local health departments. CALL YOUR DOCTOR IF     Call your doctor if pain is NOT relieved by medication.   Excessive bleeding that does not stop after holding pressure over the area  · Temperature of 101 degrees F or above  · Excessive redness, swelling or bruising, and/ or green or yellow, smelly discharge from incision      TYPICAL SIDE EFFECTS OF PAIN MEDICATION:     Constipation: Drink lots of fluids. Over the counter stool softener if needed.    Nausea: Take pain medication with food. Call your doctor with persistent nausea. ACTIVITY  · As tolerated and as directed by your doctor. · Bathe or shower as directed by your doctor. DIET  · Day of surgery: Clear liquids until no nausea or vomiting; small portion, light diet [place] foods (ex: baked chicken, plain rice, grits, scrambled eggs, toast). Nothing greasy, fried or spicy today. · Advance to regular diet on second day, unless your doctor orders otherwise. · If nausea and vomiting continues, call your doctor. PAIN  · Take pain medication as directed by your doctor. · DO NOT take aspirin or blood thinners unless directed by your doctor. AFTER ANESTHESIA   · For the first 24 hours: DO NOT Drive, Drink alcoholic beverages, or Make important decisions. · Be aware of dizziness following anesthesia and while taking pain medication. DISCHARGE SUMMARY from Nurse    PATIENT INSTRUCTIONS:    After general anesthesia or intravenous sedation, for 24 hours or while taking prescription Narcotics:  · Limit your activities  · Do not drive and operate hazardous machinery  · Do not make important personal or business decisions  · Do  not drink alcoholic beverages  · If you have not urinated within 8 hours after discharge, please contact your surgeon on call. *  Please give a list of your current medications to your Primary Care Provider. *  Please update this list whenever your medications are discontinued, doses are      changed, or new medications (including over-the-counter products) are added. *  Please carry medication information at all times in case of emergency situations. Preventing Infection at Home  We care about preventing infection and avoiding the spread of germs - not only when you are in the hospital but also when you return home. When you return home from the hospital, its important to take the following steps to help prevent infection and avoid spreading germs that could infect you and others. Ask everyone in your home to follow these guidelines, too. Clean Your Hands  · Clean your hands whenever your hands are visibly dirty, before you eat, before or after touching your mouth, nose or eyes, and before preparing food. Clean them after contact with body fluids, using the restroom, touching animals or changing diapers. · When washing hands, wet them with warm water and work up a lather. Rub hands for at least 15 seconds, then rinse them and pat them dry with a clean towel or paper towel. · When using hand sanitizers, it should take about 15 seconds to rub your hands dry.  If not, you probably didnt apply enough . Cover Your Sneeze or Cough  Germs are released into the air whenever you sneeze or cough. To prevent the spread of infection:  · Turn away from other people before coughing or sneezing. · Cover your mouth or nose with a tissue when you cough or sneeze. Put the tissue in the trash. · If you dont have a tissue, cough or sneeze into your upper sleeve, not your hands. · Always clean your hands after coughing or sneezing. Care for Wounds  Your skin is your bodys first line of defense against germs, but an open wound leaves an easy way for germs to enter your body. To prevent infection:  · Clean your hands before and after changing wound dressings, and wear gloves to change dressings if recommended by your doctor. · Take special care with IV lines or other devices inserted into the body. If you must touch them, clean your hands first.  · Follow any specific instructions from your doctor to care for your wounds. Contact your doctor if you experience any signs of infection, such as fever or increased redness at the surgical or wound site. Keep a Clean Home  · Clean or wipe commonly touched hard surfaces like door handles, sinks, tabletops, phones and TV remotes. · Use products labeled disinfectant to kill harmful bacteria and viruses. · Use a clean cloth or paper towel to clean and dry surfaces. Wiping surfaces with a dirty dishcloth, sponge or towel will only spread germs. · Never share toothbrushes, ventura, drinking glasses, utensils, razor blades, face cloths or bath towels to avoid spreading germs. · Be sure that the linens that you sleep on are clean. · Keep pets away from wounds and wash your hands after touching pets, their toys or bedding. We care about you and your health. Remember, preventing infections is a team effort between you, your family, friends and health care providers.        These are general instructions for a healthy lifestyle:    No smoking/ No tobacco products/ Avoid exposure to second hand smoke    Surgeon General's Warning:  Quitting smoking now greatly reduces serious risk to your health. Obesity, smoking, and sedentary lifestyle greatly increases your risk for illness    A healthy diet, regular physical exercise & weight monitoring are important for maintaining a healthy lifestyle    You may be retaining fluid if you have a history of heart failure or if you experience any of the following symptoms:  Weight gain of 3 pounds or more overnight or 5 pounds in a week, increased swelling in our hands or feet or shortness of breath while lying flat in bed. Please call your doctor as soon as you notice any of these symptoms; do not wait until your next office visit. Recognize signs and symptoms of STROKE:    F-face looks uneven    A-arms unable to move or move unevenly    S-speech slurred or non-existent    T-time-call 911 as soon as signs and symptoms begin-DO NOT go       Back to bed or wait to see if you get better-TIME IS BRAIN. Advance Care Planning  People with COVID-19 may have no symptoms, mild symptoms, such as fever, cough, and shortness of breath or they may have more severe illness, developing severe and fatal pneumonia. As a result, Advance Care Planning with attention to naming a health care decision maker (someone you trust to make healthcare decisions for you if you could not speak for yourself) and sharing other health care preferences is important BEFORE a possible health crisis. Please contact your Primary Care Provider to discuss Advance Care Planning.      Preventing the Spread of Coronavirus Disease 2019 in Homes and Residential Communities  For the most recent information go to [email or web address]    Prevention steps for People with confirmed or suspected COVID-19 (including persons under investigation) who do not need to be hospitalized  and   People with confirmed COVID-19 who were hospitalized and determined to be medically stable to go home    Your healthcare provider and public health staff will evaluate whether you can be cared for at home. If it is determined that you do not need to be hospitalized and can be isolated at home, you will be monitored by staff from your local or state health department. You should follow the prevention steps below until a healthcare provider or local or state health department says you can return to your normal activities. Stay home except to get medical care  People who are mildly ill with COVID-19 are able to isolate at home during their illness. You should restrict activities outside your home, except for getting medical care. Do not go to work, school, or public areas. Avoid using public transportation, ride-sharing, or taxis. Separate yourself from other people and animals in your home  People: As much as possible, you should stay in a specific room and away from other people in your home. Also, you should use a separate bathroom, if available. Animals: You should restrict contact with pets and other animals while you are sick with COVID-19, just like you would around other people. Although there have not been reports of pets or other animals becoming sick with COVID-19, it is still recommended that people sick with COVID-19 limit contact with animals until more information is known about the virus. When possible, have another member of your household care for your animals while you are sick. If you are sick with COVID-19, avoid contact with your pet, including petting, snuggling, being kissed or licked, and sharing food. If you must care for your pet or be around animals while you are sick, wash your hands before and after you interact with pets and wear a facemask. Call ahead before visiting your doctor  If you have a medical appointment, call the healthcare provider and tell them that you have or may have COVID-19.  This will help the healthcare providers office take steps to keep other people from getting infected or exposed. Wear a facemask  You should wear a facemask when you are around other people (e.g., sharing a room or vehicle) or pets and before you enter a healthcare providers office. If you are not able to wear a facemask (for example, because it causes trouble breathing), then people who live with you should not stay in the same room with you, or they should wear a facemask if they enter your room. Cover your coughs and sneezes  Cover your mouth and nose with a tissue when you cough or sneeze. Throw used tissues in a lined trash can. Immediately wash your hands with soap and water for at least 20 seconds or, if soap and water are not available, clean your hands with an alcohol-based hand  that contains at least 60% alcohol. Clean your hands often  Wash your hands often with soap and water for at least 20 seconds, especially after blowing your nose, coughing, or sneezing; going to the bathroom; and before eating or preparing food. If soap and water are not readily available, use an alcohol-based hand  with at least 60% alcohol, covering all surfaces of your hands and rubbing them together until they feel dry. Soap and water are the best option if hands are visibly dirty. Avoid touching your eyes, nose, and mouth with unwashed hands. Avoid sharing personal household items  You should not share dishes, drinking glasses, cups, eating utensils, towels, or bedding with other people or pets in your home. After using these items, they should be washed thoroughly with soap and water. Clean all high-touch surfaces everyday  High touch surfaces include counters, tabletops, doorknobs, bathroom fixtures, toilets, phones, keyboards, tablets, and bedside tables. Also, clean any surfaces that may have blood, stool, or body fluids on them. Use a household cleaning spray or wipe, according to the label instructions.  Labels contain instructions for safe and effective use of the cleaning product including precautions you should take when applying the product, such as wearing gloves and making sure you have good ventilation during use of the product. Monitor your symptoms  Seek prompt medical attention if your illness is worsening (e.g., difficulty breathing). Before seeking care, call your healthcare provider and tell them that you have, or are being evaluated for, COVID-19. Put on a facemask before you enter the facility. These steps will help the healthcare providers office to keep other people in the office or waiting room from getting infected or exposed. Ask your healthcare provider to call the local or state health department. Persons who are placed under active monitoring or facilitated self-monitoring should follow instructions provided by their local health department or occupational health professionals, as appropriate. When working with your local health department check their available hours. If you have a medical emergency and need to call 911, notify the dispatch personnel that you have, or are being evaluated for COVID-19. If possible, put on a facemask before emergency medical services arrive. Discontinuing home isolation  Patients with confirmed COVID-19 should remain under home isolation precautions until the risk of secondary transmission to others is thought to be low. The decision to discontinue home isolation precautions should be made on a case-by-case basis, in consultation with healthcare providers and state and local health departments.

## 2020-12-14 NOTE — ANESTHESIA PROCEDURE NOTES
Peripheral Block    Start time: 12/14/2020 9:46 AM  End time: 12/14/2020 9:50 AM  Performed by: Senthil Lo MD  Authorized by: Senthil Lo MD       Pre-procedure:    Indications: at surgeon's request and post-op pain management    Preanesthetic Checklist: patient identified, risks and benefits discussed, site marked, timeout performed, anesthesia consent given and patient being monitored    Timeout Time: 09:46          Block Type:   Block Type:  Supraclavicular  Laterality:  Right  Monitoring:  Standard ASA monitoring, continuous pulse ox, frequent vital sign checks, heart rate, oxygen and responsive to questions  Injection Technique:  Single shot  Procedures: ultrasound guided and nerve stimulator    Prep: chlorhexidine    Location:  Supraclavicular  Needle Type:  Stimuplex  Needle Gauge:  20 G  Needle Localization:  Anatomical landmarks, ultrasound guidance and nerve stimulator  Medication Injected:  Ropivacaine (PF) (NAROPIN)(0.5%) 5 mg/mL injection, 40 mL  dexamethasone (DECADRON) 4 mg/mL injection, 5 mg  Med Admin Time: 12/14/2020 9:50 AM    Assessment:  Number of attempts:  1  Injection Assessment:  Incremental injection every 5 mL, local visualized surrounding nerve on ultrasound, negative aspiration for blood, no paresthesia, no intravascular symptoms and ultrasound image on chart  Patient tolerance:  Patient tolerated the procedure well with no immediate complications

## 2020-12-14 NOTE — H&P
H&P Update: [facility] Rakesh Van was seen and examined. History and physical has been reviewed. The patient has been examined.  There have been no significant clinical changes since the completion of the originally dated History and Gillian Sanz MD, PhD  Orthopaedic Surgery  12/14/20  9:40 AM

## 2020-12-14 NOTE — ANESTHESIA PREPROCEDURE EVALUATION
Relevant Problems   No relevant active problems       Anesthetic History   No history of anesthetic complications            Review of Systems / Medical History  Patient summary reviewed and pertinent labs reviewed    Pulmonary    COPD               Neuro/Psych   Within defined limits           Cardiovascular    Hypertension          Hyperlipidemia    Exercise tolerance: >4 METS     GI/Hepatic/Renal  Within defined limits              Endo/Other        Morbid obesity and arthritis     Other Findings            Physical Exam    Airway  Mallampati: II  TM Distance: > 6 cm  Neck ROM: normal range of motion   Mouth opening: Normal     Cardiovascular    Rhythm: regular           Dental  No notable dental hx       Pulmonary                 Abdominal  GI exam deferred       Other Findings            Anesthetic Plan    ASA: 3  Anesthesia type: total IV anesthesia - supraclavicular block      Post-op pain plan if not by surgeon: peripheral nerve block single    Induction: Intravenous  Anesthetic plan and risks discussed with: Patient

## 2020-12-14 NOTE — OP NOTES
Hand Surgery Operative Note      Juliet Chavarria   52 y.o.   female     Pre-op diagnosis: Right Lateral Epicondylitis    Post op diagnosis: same    Procedure: Right Lateral epicondyle debridement and Tenon reattachment, Radiocapitellar Joint Arthrotomy, Synovectomy and Plica excision ([id number], [id number])    Surgeon: Oscar Moon MD, PhD      Anesthesia: Regional block + MAC    Tourniquet time: * Missing tourniquet times found for documented tourniquets in lo *    Blood Loss: Minimal    Implants  Implant Name Type Inv. Item Serial No.  Lot No. LRB No. Used Action   ANCHOR SUT DIA1. 8MM FOR ACET LABRAL REP Q-FIX - [id number]  ANCHOR SUT DIA1. 8MM FOR ACET LABRAL REP Q-FIX  [facility] AND [employer] [id number] Right 2 Implanted       Procedure indications: Patient with recalcitrant lateral elbow pain which has failed conservative measures including therapy, steroid injection and antiinflammatories. MRI changes are consistent with lateral epicondylitis. After Thorough discussion, the patient decided to proceed with surgical management. We discussed in detail surgical risks including scar, pain, bleeding, infection, anesthetic risks, neurovascular injury, need for further surgery,  weakness, stiffness, risk of death and potential risk of other unforseen complication. Procedure description:    A non-sterile tourniquet was placed on the Right arm. The Right upper extremity was pre scrubbed and then prepped and draped in routine sterile fashion. An incisional timeout was performed re-confirming the correct patient, surgical site and procedure, as well as verifying antibiotics. A lateral approach was used over the lateral epicondyle and extended distally for 3 cms. Bovie dissection was carried down to the fascia and the interval between [address] and ECRL was incised with a knife. The underlying ECRB was identified and sharply debrided with a knife.  There was significant mucoid degeneration of the ECRB. The radiocapitellar joint was incised and a plica was excised, thorough synovectomy was done with a knife. The cartilage was in good condition. Two S&N suture anchors were applied, one proximal on the lateral epicondyle and one more distal. The ECRB was repaired as well as the ECRL and EDC. The interval was closed with #2 fiberwire until a tight seal was achieved. The topaz radiofrequency wand was then used over the repaired tendons. The tourniquet was deflated and hemostasis was ensured. The wounds were then thoroughly irrigated and closed in layered fashion with 3-0 monocryl and 4-0 Stratafix. Disposition: To PACU with no complications and follow up per routine. Precautions include avoidance of heavy and repetitive lifting for 2 weeks, when an appointment for follow up and suture removal will take place.     Ben Marcelo MD, PhD  Orthopaedic Surgery  12/14/20  10:58 AM

## 2020-12-14 NOTE — ANESTHESIA POSTPROCEDURE EVALUATION
Procedure(s):  RIGHT EPICONDYLE DEBRIDEMENT AND TENDON REATTACHMENT, EXCISION OF PLICA .     total IV anesthesia    Anesthesia Post Evaluation      Multimodal analgesia: multimodal analgesia used between 6 hours prior to anesthesia start to PACU discharge  Patient location during evaluation: PACU  Patient participation: complete - patient participated  Level of consciousness: awake  Pain management: adequate  Airway patency: patent  Anesthetic complications: no  Cardiovascular status: acceptable  Respiratory status: acceptable  Hydration status: acceptable  Post anesthesia nausea and vomiting:  none  Final Post Anesthesia Temperature Assessment:  Normothermia (36.0-37.5 degrees C)      INITIAL Post-op Vital signs:   Vitals Value Taken Time   /58 12/14/2020 11:35 AM   Temp 36.5 °C (97.7 °F) 12/14/2020 11:06 AM   Pulse 83 12/14/2020 11:35 AM   Resp 14 12/14/2020 11:35 AM   SpO2 92 % 12/14/2020 11:35 AM

## 2021-01-04 ENCOUNTER — HOSPITAL ENCOUNTER (OUTPATIENT)
Dept: PHYSICAL THERAPY | Age: 51
Discharge: HOME | End: 2021-01-04
Payer: MEDICARE

## 2021-01-04 PROCEDURE — 97161 PT EVAL LOW COMPLEX 20 MIN: CPT

## 2021-01-04 PROCEDURE — 97110 THERAPEUTIC EXERCISES: CPT

## 2021-01-04 PROCEDURE — 97140 MANUAL THERAPY 1/> REGIONS: CPT

## 2021-01-04 NOTE — THERAPY EVALUATION
[address] : 1970 [place] Secondary: [address] at [address], [place], [address] [place] [address] [phone]   [phone] OUTPATIENT PHYSICAL THERAPY:Initial Assessment 2021 ICD-10: Treatment Diagnosis: lateral epicondylitis, right elbow (M77.11) Treatment Diagnosis 2: pain in right elbow (M25.521) Treatment Diagnosis 3: stiffness of right elbow (M25.631) Precautions: Hypertension, sleep apnea, COPD, fibromyalgia Allergies: Patient has no known allergies. TREATMENT PLAN: 
Effective Dates: 2021 TO 3/5/2021 (60 days). Frequency/Duration: 2 times a week for 60 Day(s) MEDICAL/REFERRING DIAGNOSIS: 
Lateral epicondylitis, right elbow [M77.11] DATE OF ONSET: patient with chronic pain in R elbow for two years, no change with conservative treatments/ injections. Patient opted for surgical intervention and underwent right lateral epicondyle debridement and tendon reattachment on 2020. REFERRING PHYSICIAN: Elbert Etienne NP MD Orders: Evaluate and Treat Return MD Appointment: 2021 INITIAL ASSESSMENT:  Ms. Peyman Moe is a 52 y.o. female presenting to physical therapy with complaints of R elbow pain and stiffness s/p right lateral epicondyle debridement and tendon reattachment on 12/14/2020. Patient reports chronic elbow pain for about 2 years which did not resolve with conservative measures/ injections and opted for surgical intervention. Patient with minimal complaints since suture removal last week and rates her pain 0/10 at rest and up to 5/10 when trying to fix her hair. She does have some soreness in her L UE from compensating and has a history of chronic back and neck pain. Patient is eager to improve her overall mobility, ROM, and strength in order to return to prior level of independence and function. Patient presents with increased pain, decreased strength, decreased ROM, decreased flexibility, impaired gait, impaired posture, impaired overhead reach, impaired transfer ability, decreased activity tolerance, and overall impaired functional mobility. Patient is a good candidate for skilled physical therapy interventions to include manual therapy, therapeutic exercise, transfer training, postural re-education, body mechanics training, and pain modalities as needed. PROBLEM LIST (Impacting functional limitations): 1. Decreased Strength 2. Decreased ADL/Functional Activities 3. Increased Pain 4. Decreased Activity Tolerance 5. Decreased Pacing Skills 6. Decreased Flexibility/Joint Mobility INTERVENTIONS PLANNED: (Treatment may consist of any combination of the following) 1. Cold 2. Cryotherapy 3. Family Education 4. Heat 5. Home Exercise Program (HEP) 6. Manual Therapy 7. Neuromuscular Re-education/Strengthening 8. Range of Motion (ROM) 9. Therapeutic Activites 10. Therapeutic Exercise/Strengthening 11. Ultrasound (US)  
 
GOALS: (Goals have been discussed and agreed upon with patient.) Short-Term Functional Goals: Time Frame: 1/4/2021 to 2/4/2021 1. Patient demonstrates independence with home exercise program without verbal cueing provided by therapist.  
2. Patient will report no more than 3/10 R elbow pain with ADLs in order to demonstrate improved self pain control and tolerance. 3. Patient will exhibit mo more than 1 cm circumferential difference between R and L elbow in order to demonstrate improved edema control. Discharge Goals: Time Frame: 1/4/2021 to 3/5/2021 1. Patient will improve R elbow ROM to 0-135 degrees in order to demonstrate full ROM. 2. Patient will improve R wrist flexion and extension by 15 degrees total in order to demonstrate improved tissue and joint mobility. 3. Patient will improve gross R UE strength to at least 4+/5 in order to return to prior level of activities. 4. Patient improve R wrist radial deviation to at least 25 degrees in order to restore full symmetrical ROM. 5. Patient will report minimal to no complaints of R elbow pain with daily activities, including fixing her hair, in order to return to prior independence and functional level. 6. Patient will improve Disability of the Arm, Shoulder, and Hand score to 14/55 from 19/55. Outcome Measure: Tool Used: Disabilities of the Arm, Shoulder and Hand (DASH) Questionnaire - Quick Version Score:  Initial: 19/55  Most Recent: X/55 (Date: -- ) Interpretation of Score: The DASH is designed to measure the activities of daily living in person's with upper extremity dysfunction or pain. Each section is scored on a 1-5 scale, 5 representing the greatest disability. The scores of each section are added together for a total score of 55. Medical Necessity:  
· Patient is expected to demonstrate progress in strength, range of motion, coordination and functional technique to increase independence with reaching and fixing her hair. · Skilled intervention continues to be required due to strength and ROM limitations hindering return to prior level of independence and activity. Reason for Services/Other Comments: 
· Patient continues to require skilled intervention due to decreased strength and ROM in her R elbow hindering return to prior level of independence and function. Total Evaluation Duration: 20 minutes Rehabilitation Potential For Stated Goals: Good Regarding [facility] Chavarria's therapy, I certify that the treatment plan above will be carried out by a therapist or under their direction. Thank you for this referral, Julio César Nugent PT Referring Physician Signature: Arthuro Mariangel, GIO              Date PAIN/SUBJECTIVE:  
 Initial: Pain Intensity 1: 5(at worst- trying to fix hair) Pain Location 1: Elbow Pain Orientation 1: Right, Lateral  Post Session:  0/10 at rest  
 HISTORY:  
 History of Injury/Illness (Reason for Referral): Ms. Eva Wadsworth is a 52 y.o. female presenting to physical therapy with complaints of R elbow pain and stiffness s/p right lateral epicondyle debridement and tendon reattachment on 12/14/2020. Patient reports chronic elbow pain for about 2 years which did not resolve with conservative measures/ injections and opted for surgical intervention. Patient with minimal complaints since suture removal last week and rates her pain 0/10 at rest and up to 5/10 when trying to fix her hair. She does have some soreness in her L UE from compensating and has a history of chronic back and neck pain. Patient is eager to improve her overall mobility, ROM, and strength in order to return to prior level of independence and function. Patient presents with increased pain, decreased strength, decreased ROM, decreased flexibility, impaired gait, impaired posture, impaired overhead reach, impaired transfer ability, decreased activity tolerance, and overall impaired functional mobility. Past Medical History/Comorbidities: Ms. Zurdo Gabriel  has a past medical history of Arthritis, Chronic back pain, Cigarette smoker, COPD (chronic obstructive pulmonary disease) ([address] Esophageal reflux, Fibromyalgia, High triglycerides, Insomnia, Mixed hyperlipidemia (2014), and Right elbow pain. Ms. Zurdo Gabriel  has a past surgical history that includes hx appendectomy; hx  section; hx colonoscopy; hx hysterectomy; and hx hernia repair (10/2019). Social History/Living Environment:  
  Patient lives in a private residence with her daughters. Prior Level of Function/Work/Activity: Not working. Dominant Side:  
      RIGHT Ambulatory/Rehab Services H2 Model Falls Risk Assessment Risk Factors: 
     No Risk Factors Identified Ability to Rise from Chair: 
     (1)  Pushes up, successful in one attempt Falls Prevention Plan: No modifications necessary Total: (5 or greater = High Risk): 1  [facility] . [facility] Patent #[id number]. Federal Law prohibits the replication, distribution or use without written permission from [facility] Current Medications:   
   
Current Outpatient Medications:  
  zolpidem (Ambien) 10 mg tablet, Take 1 Tab by mouth nightly as needed for Sleep., Disp: 30 Tab, Rfl: 2 
  omega-3 acid ethyl esters (Lovaza) 1 gram capsule, Take 2 Caps by mouth two (2) times a day. For elevated triglycerides, over 1000, Disp: 120 Cap, Rfl: 5 
  fenofibrate nanocrystallized (Tricor) 145 mg tablet, Take 1 Tab by mouth daily. , Disp: 90 Tab, Rfl: 3 
  lisinopril-hydroCHLOROthiazide (PRINZIDE, ZESTORETIC) 10-12.5 mg per tablet, Take 1 Tab by mouth daily. , Disp: 90 Tab, Rfl: 1 
  albuterol (ProAir HFA) 90 mcg/actuation inhaler, Take 2 Puffs by inhalation every six (6) hours as needed for Wheezing or Shortness of Breath., Disp: 1 Inhaler, Rfl: 5   omeprazole (PRILOSEC) 40 mg capsule, Take 1 Cap by mouth daily. (Patient taking differently: Take 40 mg by mouth daily as needed.), Disp: 90 Cap, Rfl: 1 
  HYDROcodone-acetaminophen (NORCO) 7.5-325 mg per tablet, Take 1 Tab by mouth three (3) times daily. , Disp: , Rfl:  
  pregabalin (LYRICA) 150 mg capsule, Take 150 mg by mouth three (3) times daily. , Disp: , Rfl:  
  aspirin delayed-release 81 mg tablet, Take 81 mg by mouth daily. , Disp: , Rfl:   
 Date Last Reviewed:  1/4/2021 Number of Personal Factors/Comorbidities that affect the Plan of Care: 
Based on past medical history/ co-morbidities 1-2: MODERATE COMPLEXITY EXAMINATION:  
 Observation/Orthostatic Postural Assessment:   
      Patient with forward head and shoulders, slumped posture. R elbow held in flexion and adduction. Incision site on lateral R elbow healing well with immature scar tissue, slight keloid scarring, and moderate adhesions. No signs/ symptoms of infection or DVT noted. Palpation: Moderate tenderness to palpation of wrist extensor muscle belly with moderate tightness noted. Minimal tenderness over incision site with moderate adhesions noted. Moderate tightness and tenderness to palpation of R brachioradialis. ROM:   
AROM/ PROM Left (degrees) Right (degrees) Elbow Flexion 140 124 Elbow Extension 3 Lacking 15 Pronation 25 20 Supination 30 20 Wrist Flexion 95 85 Wrist Extension 65 60 Strength: Motion Tested Left   (*/5) Right  (*/5) Shoulder Flexion 5 4 Scaption 5 4+ Shoulder Internal Rotation  5 5 Shoulder External Rotation 5 5 Elbow Flexion 5 4+ Elbow Extension 5 4+ Wrist Flexion 5 5 Wrist Extension 5 4 Special Tests:   
      None performed due to acuity of surgery Passive Accessory Motion:  
      None performed- will assess as needed Neurological Screen: Myotomes: Key muscle strength testing through bilateral UE is [place]. Dermatomes: Sensation to light touch for bilateral UE is intact from C4 to T2. Reflexes: Biceps (C5): 2+ bilaterally Brachioradialis (C6): 2+ bilaterally Triceps (C7): 2+ bilaterally Functional Mobility:  
      Patient with decreased strength, pain with fixing her hair, having to use L UE to walk the dog, difficulty cooking/ housework. Balance:   
      Sitting and standing balance grossly intact. Body Structures Involved: 1. Bones 2. Joints 3. Muscles 4. Ligaments Body Functions Affected: 1. Sensory/Pain 2. Neuromusculoskeletal 
3. Movement Related Activities and Participation Affected: 1. General Tasks and Demands 2. Self Care 3. Domestic Life Number of elements (examined above) that affect the Plan of Care: 1-2: LOW COMPLEXITY CLINICAL PRESENTATION:  
 Presentation: 
 Stable and uncomplicated: LOW COMPLEXITY CLINICAL DECISION MAKING:  
 Use of outcome tool(s) and clinical judgement create a POC that gives a: Clear prediction of patient's progress: LOW COMPLEXITY

## 2021-01-04 NOTE — PROGRESS NOTES
Liliana Galeano Chavarria  : 1970  [place]  Secondary: [address] at [address], [facility], [address]  [phone]   [phone]      OUTPATIENT PHYSICAL THERAPY: Daily Treatment Note 2021    ICD-10: Treatment Diagnosis: lateral epicondylitis, right elbow (M77.11)                Treatment Diagnosis 2: pain in right elbow (M25.521)                Treatment Diagnosis 3: stiffness of right elbow (M25.631)  Precautions: Hypertension, sleep apnea, COPD, fibromyalgia  Allergies: Patient has no known allergies. TREATMENT PLAN:  Effective Dates: 2021 TO 3/5/2021 (60 days). Frequency/Duration: 2 times a week for 60 Day(s) MEDICAL/REFERRING DIAGNOSIS:  Lateral epicondylitis, right elbow [M77.11]   DATE OF ONSET: patient with chronic pain in R elbow for two years, no change with conservative treatments/ injections. Patient opted for surgical intervention and underwent right lateral epicondyle debridement and tendon reattachment on 2020. REFERRING PHYSICIAN: Susan Reyes NP MD Orders: Evaluate and Treat   Return MD Appointment: 2021     Pre-treatment Symptoms/Complaints:  Patient reports minimal to no pain at rest. Increased pain when trying to fix hair. Pain: Initial: Pain Intensity 1: 5(at worst- trying to fix hair)  Pain Location 1: Elbow  Pain Orientation 1: Right, Lateral  Post Session:  0/10 at rest   Medications Last Reviewed:  2021  Updated Objective Findings:  See evaluation note from today   TREATMENT:   THERAPEUTIC EXERCISE: (15 minutes):  Exercises per grid below to improve mobility, strength, coordination and dynamic movement of elbow - right to improve functional lifting, reaching and daily activities. Required moderate visual, verbal and manual cues to promote proper body alignment, promote proper body posture and promote proper body mechanics.   Progressed resistance, range, repetitions and complexity of movement as indicated. Date:  1/4/2021 Date:   Date:     Activity/Exercise Parameters Parameters Parameters    10 x 5 seconds     Wrist flexion/ extension AROM x 10 each  Stretch x 3     Elbow flexion/ extension AROM x 10 each  Stretch x 5     Pronation/ supination X 10 each                         Time spent with patient reviewing proper muscle recruitment and technique with exercises. MANUAL THERAPY: (10 minutes): Joint mobilization and Soft tissue mobilization was utilized and necessary because of the patient's restricted joint motion, painful spasm, loss of articular motion and restricted motion of soft tissue   Soft tissue mobilization to R wrist extensor muscle belly   Cross friction massage over incision site to decrease tightness and adhesions    MODALITIES: (0 minutes):      none today     HEP: As above; handouts given to patient for all exercises. Treatment/Session Summary:    · Response to Treatment:  Patient with good understanding of HEP and precautions. .  · Baseline vitals (1/4/2021): BP: 138/88  · Communication/Consultation:  Spoke at length with patient regarding safety, HEP, and progression of care  · Equipment provided today:  Patient given handout with above exercises for home  · Recommendations/Intent for next treatment session: Next visit will focus on manual therapy and modalities as needed, strengthening and stretching as tolerated.     Total Treatment Billable Duration:  50 minutes: 25 evaluation, 10 manual, 15 therapeutic exercise  PT Patient Time In/Time Out  Time In: 0905  Time Out: 1000 [facility], PT

## 2021-01-08 ENCOUNTER — HOSPITAL ENCOUNTER (OUTPATIENT)
Dept: PHYSICAL THERAPY | Age: 51
Discharge: HOME | End: 2021-01-08
Payer: MEDICARE

## 2021-01-08 PROCEDURE — 97140 MANUAL THERAPY 1/> REGIONS: CPT

## 2021-01-08 PROCEDURE — 97110 THERAPEUTIC EXERCISES: CPT

## 2021-01-08 NOTE — PROGRESS NOTES
Liliana Galeano Chavarria  : 1970  [place]  Secondary: [address] at [facility]  [address] [place] [address], [place], [address] [place] [address]  [phone]   [phone]      OUTPATIENT PHYSICAL THERAPY: Daily Treatment Note 2021    ICD-10: Treatment Diagnosis: lateral epicondylitis, right elbow (M77.11)                Treatment Diagnosis 2: pain in right elbow (M25.521)                Treatment Diagnosis 3: stiffness of right elbow (M25.631)  Precautions: Hypertension, sleep apnea, COPD, fibromyalgia  Allergies: Patient has no known allergies. TREATMENT PLAN:  Effective Dates: 2021 TO 3/5/2021 (60 days). Frequency/Duration: 2 times a week for 60 Day(s) MEDICAL/REFERRING DIAGNOSIS:  Lateral epicondylitis, right elbow [M77.11]   DATE OF ONSET: patient with chronic pain in R elbow for two years, no change with conservative treatments/ injections. Patient opted for surgical intervention and underwent right lateral epicondyle debridement and tendon reattachment on 2020. REFERRING PHYSICIAN: Susan Reyes NP MD Orders: Evaluate and Treat   Return MD Appointment: 2021     Pre-treatment Symptoms/Complaints:  Patient reports some soreness in her elbow with the increased motion, but feeling good. Pain: Initial:    Post Session:  0/10 at rest   Medications Last Reviewed:  2021  Updated Objective Findings:  Improved AROM elbow flexion and extension today   TREATMENT:   THERAPEUTIC EXERCISE: (38 minutes):  Exercises per grid below to improve mobility, strength, coordination and dynamic movement of elbow - right to improve functional lifting, reaching and daily activities. Required moderate visual, verbal and manual cues to promote proper body alignment, promote proper body posture and promote proper body mechanics. Progressed resistance, range, repetitions and complexity of movement as indicated.      Date:  2021 Date:  1/8/2021 Date:     Activity/Exercise Parameters Parameters Parameters    10 x 5 seconds 3 x 10 with heat    Wrist flexion/ extension AROM x 10 each  Stretch x 3 1 lb, 2 x 10 each  Yellow therabar x 10 each    Elbow flexion/ extension AROM x 10 each  Stretch x 5 Active 2 x 10 each  Stretch x 5 each    Pronation/ supination X 10 each AROM 2 x 10 with heat  1 lb, 2 x 10 each    Elbow extension stretch --- 1 lb, prop, x 2 minutes                  Time spent with patient reviewing proper muscle recruitment and technique with exercises. MANUAL THERAPY: (15 minutes): Joint mobilization and Soft tissue mobilization was utilized and necessary because of the patient's restricted joint motion, painful spasm, loss of articular motion and restricted motion of soft tissue   Soft tissue mobilization to R wrist extensor muscle belly   Cross friction massage over incision site to decrease tightness and adhesions    MODALITIES: (15 minutes): *  Hot Pack Therapy in order to provide analgesia and relieve muscle spasm. x 10 minutes with exercises  Ice pack x 5 minutes at end of session to decrease soreness    HEP: As above; handouts given to patient for all exercises. Treatment/Session Summary:    · Response to Treatment:  Good tolerance for exercises today with mild increased soreness. Advised patient to use ice and heat at home as needed and continue with HEP  · Baseline vitals (1/4/2021): BP: 138/88  · Communication/Consultation:  None today  · Equipment provided today:  None today  · Recommendations/Intent for next treatment session: Next visit will focus on manual therapy and modalities as needed, strengthening and stretching as tolerated.     Total Treatment Billable Duration:  53 minutes     Mile Goldberg, PT

## 2021-01-12 ENCOUNTER — HOSPITAL ENCOUNTER (OUTPATIENT)
Dept: PHYSICAL THERAPY | Age: 51
Discharge: HOME | End: 2021-01-12
Payer: MEDICARE

## 2021-01-12 PROCEDURE — 97140 MANUAL THERAPY 1/> REGIONS: CPT

## 2021-01-12 PROCEDURE — 97110 THERAPEUTIC EXERCISES: CPT

## 2021-01-12 NOTE — PROGRESS NOTES
Sonny Chavarria  : 1970  [place]  Secondary: [address] at [facility]  [address] [place] [address], [place], [address] [place] [address]  [phone]   [phone]      OUTPATIENT PHYSICAL THERAPY: Daily Treatment Note 2021    ICD-10: Treatment Diagnosis: lateral epicondylitis, right elbow (M77.11)                Treatment Diagnosis 2: pain in right elbow (M25.521)                Treatment Diagnosis 3: stiffness of right elbow (M25.631)  Precautions: Hypertension, sleep apnea, COPD, fibromyalgia  Allergies: Patient has no known allergies. TREATMENT PLAN:  Effective Dates: 2021 TO 3/5/2021 (60 days). Frequency/Duration: 2 times a week for 60 Day(s) MEDICAL/REFERRING DIAGNOSIS:  Lateral epicondylitis, right elbow [M77.11]   DATE OF ONSET: patient with chronic pain in R elbow for two years, no change with conservative treatments/ injections. Patient opted for surgical intervention and underwent right lateral epicondyle debridement and tendon reattachment on 2020. REFERRING PHYSICIAN: Annabelle Tavera NP MD Orders: Evaluate and Treat   Return MD Appointment: 2021     Pre-treatment Symptoms/Complaints:  Patient reports significant improvement past few days. Pain: Initial: Pain Intensity 1: 2  Pain Location 1: Elbow  Pain Orientation 1: Right, Lateral  Post Session:  0/10 at rest 2/10 with movement   Medications Last Reviewed:  2021  Updated Objective Findings:  Improved extension and decreased pain   TREATMENT:   THERAPEUTIC EXERCISE: (38 minutes):  Exercises per grid below to improve mobility, strength, coordination and dynamic movement of elbow - right to improve functional lifting, reaching and daily activities. Required moderate visual, verbal and manual cues to promote proper body alignment, promote proper body posture and promote proper body mechanics.   Progressed resistance, range, repetitions and complexity of movement as indicated. Date:  1/4/2021 Date:  1/8/2021 Date:  1-12-21   Activity/Exercise Parameters Parameters Parameters    10 x 5 seconds 3 x 10 with heat 2 x 10 heat   Wrist flexion/ extension AROM x 10 each  Stretch x 3 1 lb, 2 x 10 each  Yellow therabar x 10 each 3 x 10 with heat  1 lb 3 x 10  Yellow bar 3 x 10   Elbow flexion/ extension AROM x 10 each  Stretch x 5 Active 2 x 10 each  Stretch x 5 each Active 3 x 10    Stretch x 6 each   Pronation/ supination X 10 each AROM 2 x 10 with heat  1 lb, 2 x 10 each Yellow therabar 3 x 10   Elbow extension stretch --- 1 lb, prop, x 2 minutes                  Time spent with patient reviewing proper muscle recruitment and technique with exercises. MANUAL THERAPY: (15 minutes): Joint mobilization and Soft tissue mobilization was utilized and necessary because of the patient's restricted joint motion, painful spasm, loss of articular motion and restricted motion of soft tissue   Soft tissue mobilization to R wrist extensor muscle belly   Cross friction massage over incision site to decrease tightness and adhesions    MODALITIES: (10 minutes): *  Hot Pack Therapy in order to provide analgesia and relieve muscle spasm. x 10 minutes with exercises  Ice pack x 5 minutes at end of session to decrease soreness Not today    HEP: As above; handouts given to patient for all exercises. Treatment/Session Summary:    · Response to Treatment:  slight increased soreness after exercises pt does not like ice  · Baseline vitals (1/4/2021): BP: 138/88  · Communication/Consultation:  None today  · Equipment provided today:  None today  · Recommendations/Intent for next treatment session: Next visit will focus on manual therapy and modalities as needed, strengthening and stretching as tolerated.     Total Treatment Billable Duration:  53 minutes  PT Patient Time In/Time Out  Time In: 0900  Time Out: [address] [place] [address]

## 2021-01-15 ENCOUNTER — HOSPITAL ENCOUNTER (OUTPATIENT)
Dept: PHYSICAL THERAPY | Age: 51
Discharge: HOME | End: 2021-01-15
Payer: MEDICARE

## 2021-01-15 PROCEDURE — 97110 THERAPEUTIC EXERCISES: CPT

## 2021-01-15 PROCEDURE — 97140 MANUAL THERAPY 1/> REGIONS: CPT

## 2021-01-15 NOTE — PROGRESS NOTES
Snow Chavarria  : 1970  [place]  Secondary: [address] at [facility]  [facility], [place], [address] [place] [address]  [phone]   [phone]      OUTPATIENT PHYSICAL THERAPY: Daily Treatment Note 1/15/2021    ICD-10: Treatment Diagnosis: lateral epicondylitis, right elbow (M77.11)                Treatment Diagnosis 2: pain in right elbow (M25.521)                Treatment Diagnosis 3: stiffness of right elbow (M25.631)  Precautions: Hypertension, sleep apnea, COPD, fibromyalgia  Allergies: Patient has no known allergies. TREATMENT PLAN:  Effective Dates: 2021 TO 3/5/2021 (60 days). Frequency/Duration: 2 times a week for 60 Day(s) MEDICAL/REFERRING DIAGNOSIS:  Lateral epicondylitis, right elbow [M77.11]   DATE OF ONSET: patient with chronic pain in R elbow for two years, no change with conservative treatments/ injections. Patient opted for surgical intervention and underwent right lateral epicondyle debridement and tendon reattachment on 2020. REFERRING PHYSICIAN: Memo Myles NP MD Orders: Evaluate and Treat   Return MD Appointment: 2021     Pre-treatment Symptoms/Complaints:  Patient reported minimal shoulder pain today. Pain: Initial: Pain Intensity 1: 3  Pain Location 1: Elbow  Pain Orientation 1: Lateral, Right  Post Session:  2/10  ( Pt. Liked the soft cervical gel pack)   Medications Last Reviewed:  1/15/2021  Updated Objective Findings:  Pt. demonstrated increased elbow range of motion   TREATMENT:   THERAPEUTIC EXERCISE: (30 minutes):  Exercises per grid below to improve mobility, strength, coordination and dynamic movement of elbow - right to improve functional lifting, reaching and daily activities. Required moderate visual, verbal and manual cues to promote proper body alignment, promote proper body posture and promote proper body mechanics.   Progressed resistance, range, repetitions and complexity of movement as indicated. Date:  1/15/2021 Date:  1/8/2021 Date:  1-12-21   Activity/Exercise Parameters Parameters Parameters    2x10 reps with heat 3 x 10 with heat 2 x 10 heat   Wrist flexion/ extension 3x10 reps with 1lb wt. & 3x10 reps yellow ball 1 lb, 2 x 10 each  Yellow therabar x 10 each 3 x 10 with heat  1 lb 3 x 10  Yellow bar 3 x 10   Elbow flexion/ extension AROM 3 x 10 each  Stretch x 5 Active 2 x 10 each  Stretch x 5 each Active 3 x 10    Stretch x 6 each   Pronation/ supination Yellow thera bar 3x10 reps  AROM 2 x 10 with heat  1 lb, 2 x 10 each Yellow therabar 3 x 10   Elbow extension stretch 1 lb prop x 2 mins  1 lb, prop, x 2 minutes                  Time spent with patient reviewing proper muscle recruitment and technique with exercises. MANUAL THERAPY: (25 minutes): Joint mobilization and Soft tissue mobilization was utilized and necessary because of the patient's restricted joint motion, painful spasm, loss of articular motion and restricted motion of soft tissue   Soft tissue mobilization to R wrist extensor muscle belly   Cross friction massage over incision site to decrease tightness and adhesion   Gentle PROM with elbow extension. MODALITIES: (20 minutes): *  Hot Pack Therapy in order to provide analgesia and relieve muscle spasm. Pt. Received ice pack to right elbow at the end of session for swelling and pain relief. Pt. Received 10 mins each. HEP: As above; handouts given to patient for all exercises.     Treatment/Session Summary:    · Response to Treatment:  Pt. was compliant with all exercises and liked the cervical soft gel ice pack so much she stated she was going to purchase one    · Baseline vitals (1/4/2021): BP: 138/88  · Communication/Consultation:  None today  · Equipment provided today:  None today  · Recommendations/Intent for next treatment session: Next visit will focus on manual therapy and modalities as needed, strengthening and stretching as tolerated.     Total Treatment Billable Duration:  55 minutes  PT Patient Time In/Time Out  Time In: 0900  Time Out: [address] PTA

## 2021-01-19 ENCOUNTER — HOSPITAL ENCOUNTER (OUTPATIENT)
Dept: PHYSICAL THERAPY | Age: 51
Discharge: HOME | End: 2021-01-19
Payer: MEDICARE

## 2021-01-19 PROCEDURE — 97110 THERAPEUTIC EXERCISES: CPT

## 2021-01-19 PROCEDURE — 97140 MANUAL THERAPY 1/> REGIONS: CPT

## 2021-01-19 NOTE — PROGRESS NOTES
Sonny Chavarria  : 1970  [place]  Secondary: [address] at [facility]  [address] [place] [address], [place], [address] [place] [address]  [phone]   [phone]      OUTPATIENT PHYSICAL THERAPY: Daily Treatment Note 2021    ICD-10: Treatment Diagnosis: lateral epicondylitis, right elbow (M77.11)                Treatment Diagnosis 2: pain in right elbow (M25.521)                Treatment Diagnosis 3: stiffness of right elbow (M25.631)  Precautions: Hypertension, sleep apnea, COPD, fibromyalgia  Allergies: Patient has no known allergies. TREATMENT PLAN:  Effective Dates: 2021 TO 3/5/2021 (60 days). Frequency/Duration: 2 times a week for 60 Day(s) MEDICAL/REFERRING DIAGNOSIS:  Lateral epicondylitis, right elbow [M77.11]   DATE OF ONSET: patient with chronic pain in R elbow for two years, no change with conservative treatments/ injections. Patient opted for surgical intervention and underwent right lateral epicondyle debridement and tendon reattachment on 2020. REFERRING PHYSICIAN: Annabelle Tavera NP MD Orders: Evaluate and Treat   Return MD Appointment: 2021     Pre-treatment Symptoms/Complaints:  Patient reports minimal pain during the day, but a few motions causing pain intermittently. Pain: Initial: Pain Intensity 1: 1  Pain Location 1: Elbow  Pain Orientation 1: Right  Post Session:  2/10    Medications Last Reviewed:  2021  Updated Objective Findings:  R elbow ROM 3-140 degrees   TREATMENT:   THERAPEUTIC EXERCISE: (25 minutes):  Exercises per grid below to improve mobility, strength, coordination and dynamic movement of elbow - right to improve functional lifting, reaching and daily activities. Required moderate visual, verbal and manual cues to promote proper body alignment, promote proper body posture and promote proper body mechanics.   Progressed resistance, range, repetitions and complexity of movement as indicated. Date:  1/15/2021 Date:  1/19/2021 Date:  1-12-21   Activity/Exercise Parameters Parameters Parameters    2x10 reps with heat 3 x 10 with heat 2 x 10 heat   Wrist flexion/ extension 3x10 reps with 1lb wt. & 3x10 reps yellow ball 2 lb, 2 x 10 each  Red therabar 2 x 10 each 3 x 10 with heat  1 lb 3 x 10  Yellow bar 3 x 10   Elbow flexion/ extension AROM 3 x 10 each  Stretch x 5 Yellow, 2 x 10   Active 3 x 10  Stretch x 6 each   Pronation/ supination Yellow thera bar 3x10 reps  2 lb, 2 x 10 each Yellow therabar 3 x 10   Elbow extension stretch 1 lb prop x 2 mins  ---    UBE --- Level 2, 2 minutes forward, 2 minutes backwards            Time spent with patient reviewing proper muscle recruitment and technique with exercises. MANUAL THERAPY: (15 minutes): Joint mobilization and Soft tissue mobilization was utilized and necessary because of the patient's restricted joint motion, painful spasm, loss of articular motion and restricted motion of soft tissue   Soft tissue mobilization to R wrist extensor muscle belly   Cross friction massage over incision site to decrease tightness and adhesion   Gentle PROM with elbow extension. MODALITIES: (10 minutes): *  Hot Pack Therapy in order to provide analgesia and relieve muscle spasm. HEP: As above; handouts given to patient for all exercises. Treatment/Session Summary:    · Response to Treatment:  Patient with no complaints of pain during session. Full R elbow ROM. Plan to continue with progression of strengthening and resistance exercises as tolerated. · Baseline vitals (1/4/2021): BP: 138/88  · Communication/Consultation:  None today  · Equipment provided today:  None today  · Recommendations/Intent for next treatment session: Next visit will focus on manual therapy and modalities as needed, strengthening and stretching as tolerated.     Total Treatment Billable Duration:  40 minutes  PT Patient Time In/Time Out  Time In: 0905  Time Out: Mike Fernandez 71, PT

## 2021-01-22 ENCOUNTER — HOSPITAL ENCOUNTER (OUTPATIENT)
Dept: PHYSICAL THERAPY | Age: 51
Discharge: HOME | End: 2021-01-22
Payer: MEDICARE

## 2021-01-22 PROCEDURE — 97110 THERAPEUTIC EXERCISES: CPT

## 2021-01-22 PROCEDURE — 97140 MANUAL THERAPY 1/> REGIONS: CPT

## 2021-01-22 NOTE — PROGRESS NOTES
Hallie Chavarria  : 1970  [place]  Secondary: [address] at [facility]  [address] [place] [address], [place], [address] [place] [address]  [phone]   [phone]      OUTPATIENT PHYSICAL THERAPY: Daily Treatment Note 2021    ICD-10: Treatment Diagnosis: lateral epicondylitis, right elbow (M77.11)                Treatment Diagnosis 2: pain in right elbow (M25.521)                Treatment Diagnosis 3: stiffness of right elbow (M25.631)  Precautions: Hypertension, sleep apnea, COPD, fibromyalgia  Allergies: Patient has no known allergies. TREATMENT PLAN:  Effective Dates: 2021 TO 3/5/2021 (60 days). Frequency/Duration: 2 times a week for 60 Day(s) MEDICAL/REFERRING DIAGNOSIS:  Lateral epicondylitis, right elbow [M77.11]   DATE OF ONSET: patient with chronic pain in R elbow for two years, no change with conservative treatments/ injections. Patient opted for surgical intervention and underwent right lateral epicondyle debridement and tendon reattachment on 2020. REFERRING PHYSICIAN: Michelle Anthony NP MD Orders: Evaluate and Treat   Return MD Appointment: 2021     Pre-treatment Symptoms/Complaints:  Patient reports feeling good with no complaints of pain. Able to use the blower at her home Wednesday night without problem, just a little soreness after. Pain: Initial: Pain Intensity 1: 0  Pain Location 1: Elbow  Pain Orientation 1: Right  Post Session:  2/10 soreness after exercises   Medications Last Reviewed:  2021  Updated Objective Findings:  Less tightness with manual therapy today   TREATMENT:   THERAPEUTIC EXERCISE: (30 minutes):  Exercises per grid below to improve mobility, strength, coordination and dynamic movement of elbow - right to improve functional lifting, reaching and daily activities.   Required moderate visual, verbal and manual cues to promote proper body alignment, promote proper body posture and promote proper body mechanics. Progressed resistance, range, repetitions and complexity of movement as indicated. Date:  1/15/2021 Date:  1/19/2021 Date:  1/21/2021   Activity/Exercise Parameters Parameters Parameters    2x10 reps with heat 3 x 10 with heat 2 x 10    Wrist flexion/ extension 3x10 reps with 1lb wt. & 3x10 reps yellow ball 2 lb, 2 x 10 each  Red therabar 2 x 10 each 2 lbs, 3 x 10  Red therabar 2 x 10 each   Elbow flexion/ extension AROM 3 x 10 each  Stretch x 5 Yellow, 2 x 10   Red band, standing, 3 x 10 each  3 lbs, 2 x 10   Pronation/ supination Yellow thera bar 3x10 reps  2 lb, 2 x 10 each Red therabar 2 x 10   Elbow extension stretch 1 lb prop x 2 mins  --- 3 lbs, 2 x 1 minute   UBE --- Level 2, 2 minutes forward, 2 minutes backwards Level 2, 3 minutes forward, 3 minutes backwards           Time spent with patient reviewing proper muscle recruitment and technique with exercises. MANUAL THERAPY: (23 minutes): Joint mobilization and Soft tissue mobilization was utilized and necessary because of the patient's restricted joint motion, painful spasm, loss of articular motion and restricted motion of soft tissue   Soft tissue mobilization to R wrist extensor muscle belly   Cross friction massage over incision site to decrease tightness and adhesion   Gentle PROM with elbow extension. MODALITIES: (0 minutes):      none today      HEP: As above; handouts given to patient for all exercises. Treatment/Session Summary:    · Response to Treatment:  Good tolerance for increased resistance and exercise today with minor increased soreness at end of session. Plan to continue with progression of resistance and repetitions as tolerated.   · Baseline vitals (1/4/2021): BP: 138/88  · Communication/Consultation:  None today  · Equipment provided today:  None today  · Recommendations/Intent for next treatment session: Next visit will focus on manual therapy and modalities as needed, strengthening and stretching as tolerated.     Total Treatment Billable Duration:  53 minutes  PT Patient Time In/Time Out  Time In: 0905  Time Out: 1000 [facility],

## 2021-01-26 ENCOUNTER — HOSPITAL ENCOUNTER (OUTPATIENT)
Dept: PHYSICAL THERAPY | Age: 51
Discharge: HOME | End: 2021-01-26
Payer: MEDICARE

## 2021-01-26 PROCEDURE — 97110 THERAPEUTIC EXERCISES: CPT

## 2021-01-26 PROCEDURE — 97140 MANUAL THERAPY 1/> REGIONS: CPT

## 2021-01-26 NOTE — PROGRESS NOTES
Mira Negron Deon  : 1970  [place]  Secondary: [address] at [address], [address]  [phone]   [phone]       OUTPATIENT PHYSICAL THERAPY:Progress Report and 2 Week Hold 2021    ICD-10: Treatment Diagnosis: lateral epicondylitis, right elbow (M77.11)                Treatment Diagnosis 2: pain in right elbow (M25.521)                Treatment Diagnosis 3: stiffness of right elbow (M25.631)  Precautions: Hypertension, sleep apnea, COPD, fibromyalgia  Allergies: Patient has no known allergies. TREATMENT PLAN:  Effective Dates: 2021 TO 3/5/2021 (60 days). Frequency/Duration: 2 times a week for 60 Day(s) MEDICAL/REFERRING DIAGNOSIS:  Lateral epicondylitis, right elbow [M77.11]   DATE OF ONSET: patient with chronic pain in R elbow for two years, no change with conservative treatments/ injections. Patient opted for surgical intervention and underwent right lateral epicondyle debridement and tendon reattachment on 2020. REFERRING PHYSICIAN: Marilin Swanson NP MD Orders: Evaluate and Treat   Return MD Appointment: 2021     INITIAL ASSESSMENT:  Ms. Cedric Morales is a 52 y.o. female presenting to physical therapy with complaints of R elbow pain and stiffness s/p right lateral epicondyle debridement and tendon reattachment on 2020. Patient reports chronic elbow pain for about 2 years which did not resolve with conservative measures/ injections and opted for surgical intervention. Patient with minimal complaints since suture removal last week and rates her pain 0/10 at rest and up to 5/10 when trying to fix her hair. She does have some soreness in her L UE from compensating and has a history of chronic back and neck pain. Patient is eager to improve her overall mobility, ROM, and strength in order to return to prior level of independence and function.  Patient presents with increased pain, decreased strength, decreased ROM, decreased flexibility, impaired gait, impaired posture, impaired overhead reach, impaired transfer ability, decreased activity tolerance, and overall impaired functional mobility. Patient is a good candidate for skilled physical therapy interventions to include manual therapy, therapeutic exercise, transfer training, postural re-education, body mechanics training, and pain modalities as needed. PROGRESS NOTE 1/26/2021: Patient has been seen for 8 sessions of physical therapy from 1/4/2021 to 1/26/2021. She reports feeling 100% better since starting therapy with less pain, improved ROM, being able to use it at home, and no pain. She has improved with strength, ROM, and overall activity tolerance. She has met all of her goals with no complaints. Patient to see MD after today's session and is safe for discharge to home program if released by MD. Patient to call office after visit today. PROBLEM LIST (Impacting functional limitations):  1. Decreased Strength  2. Decreased ADL/Functional Activities  3. Increased Pain  4. Decreased Activity Tolerance  5. Decreased Pacing Skills  6. Decreased Flexibility/Joint Mobility INTERVENTIONS PLANNED: (Treatment may consist of any combination of the following)  1. Cold  2. Cryotherapy  3. Family Education  4. Heat  5. Home Exercise Program (HEP)  6. Manual Therapy  7. Neuromuscular Re-education/Strengthening  8. Range of Motion (ROM)  9. Therapeutic Activites  10. Therapeutic Exercise/Strengthening  11. Ultrasound (US)     GOALS: (Goals have been discussed and agreed upon with patient.)  Short-Term Functional Goals: Time Frame: 1/4/2021 to 2/4/2021  1. Patient demonstrates independence with home exercise program without verbal cueing provided by therapist. -GOAL MET  2. Patient will report no more than 3/10 R elbow pain with ADLs in order to demonstrate improved self pain control and tolerance. -GOAL MET  3.  Patient will exhibit no more than 1 cm circumferential difference between R and L elbow in order to demonstrate improved edema control. -GOAL MET  Discharge Goals: Time Frame: 1/4/2021 to 3/5/2021  1. Patient will improve R elbow ROM to 0-135 degrees in order to demonstrate full ROM. -GOAL MET  2. Patient will improve R wrist flexion and extension by 15 degrees total in order to demonstrate improved tissue and joint mobility. -GOAL MET  3. Patient will improve gross R UE strength to at least 4+/5 in order to return to prior level of activities. -GOAL MET  4. Patient improve R wrist radial deviation to at least 25 degrees in order to restore full symmetrical ROM. -GOAL MET  5. Patient will report minimal to no complaints of R elbow pain with daily activities, including fixing her hair, in order to return to prior independence and functional level. -GOAL MET  6. Patient will improve Disability of the Arm, Shoulder, and Hand score to 14/55 from 19/55. -GOAL MET    Outcome Measure: Tool Used: Disabilities of the Arm, Shoulder and Hand (DASH) Questionnaire - Quick Version  Score:  Initial: 19/55  Most Recent: 11/55 (Date: 1/26/2021 )   Interpretation of Score: The DASH is designed to measure the activities of daily living in person's with upper extremity dysfunction or pain. Each section is scored on a 1-5 scale, 5 representing the greatest disability. The scores of each section are added together for a total score of 55. UPDATED OBJECTIVE FINDINGS:  Palpation:          Minimal (from moderate) tenderness to palpation of wrist extensor muscle belly with moderate tightness noted. Minimal tenderness over incision site with moderate adhesions noted. Minimal (from moderate) tightness and tenderness to palpation of R brachioradialis.   Circumferential measurement: 26.7 L elbow, 27.5 R elbow    ROM:    AROM/ PROM Left (degrees) Right (degrees)   Elbow Flexion 140 140 (from 124)   Elbow Extension 3 0 (from lacking 15) Pronation 25 30 (from 20)   Supination 30 45 (from 20)   Wrist Flexion 95 115 (from 85)   Wrist Extension 65 80 (from 60)     Strength: Motion Tested Left   (*/5) Right  (*/5)   Shoulder Flexion 5 4+ (from 4)   Scaption 5 5 (from 4+)   Shoulder Internal Rotation  5 5   Shoulder External Rotation 5 5   Elbow Flexion 5 5 (from 4+)   Elbow Extension 5 5 (from 4+)   Wrist Flexion 5 5   Wrist Extension 5 5 (from 4)     Functional Mobility:         Patient with decreased strength, pain with fixing her hair, having to use L UE to walk the dog, difficulty cooking/ housework. 1/26/2021: Patient with improved strength, no problems fixing hair, no problems walking the dog, no difficulty with cooking or housework. Medical Necessity:   · Patient is expected to demonstrate progress in strength, range of motion, coordination and functional technique to increase independence with reaching and fixing her hair. · Skilled intervention continues to be required due to strength and ROM limitations hindering return to prior level of independence and activity. Reason for Services/Other Comments:  · Patient continues to require skilled intervention due to decreased strength and ROM in her R elbow hindering return to prior level of independence and function. Rehabilitation Potential For Stated Goals: Good  Regarding [facility] Chavarria's therapy, I certify that the treatment plan above will be carried out by a therapist or under their direction.   Thank you for this referral,  Cari Hirsch, PT

## 2021-01-26 NOTE — PROGRESS NOTES
Liliana Chavarria  : 1970  [place]  [place] [facility] at [address], [place], [address] [place] [address]  [phone]   [phone]      OUTPATIENT PHYSICAL THERAPY: Daily Treatment Note 2021    ICD-10: Treatment Diagnosis: lateral epicondylitis, right elbow (M77.11)                Treatment Diagnosis 2: pain in right elbow (M25.521)                Treatment Diagnosis 3: stiffness of right elbow (M25.631)  Precautions: Hypertension, sleep apnea, COPD, fibromyalgia  Allergies: Patient has no known allergies. TREATMENT PLAN:  Effective Dates: 2021 TO 3/5/2021 (60 days). Frequency/Duration: 2 times a week for 60 Day(s) MEDICAL/REFERRING DIAGNOSIS:  Lateral epicondylitis, right elbow [M77.11]   DATE OF ONSET: patient with chronic pain in R elbow for two years, no change with conservative treatments/ injections. Patient opted for surgical intervention and underwent right lateral epicondyle debridement and tendon reattachment on 2020. REFERRING PHYSICIAN: Susan Reyes NP MD Orders: Evaluate and Treat   Return MD Appointment: 2021     Pre-treatment Symptoms/Complaints:  Patient reports feeling better and like she can continue on her own at home. Seeing MD this morning. Pain: Initial: Pain Intensity 1: 0  Pain Location 1: Elbow  Pain Orientation 1: Right  Post Session:  0/10    Medications Last Reviewed:  2021  Updated Objective Findings:  See Progress Note/ hold from today   TREATMENT:   THERAPEUTIC EXERCISE: (30 minutes):  Exercises per grid below to improve mobility, strength, coordination and dynamic movement of elbow - right to improve functional lifting, reaching and daily activities. Required moderate visual, verbal and manual cues to promote proper body alignment, promote proper body posture and promote proper body mechanics.   Progressed resistance, range, repetitions and complexity of movement as indicated. Date:  1/26/2021 Date:  1/19/2021 Date:  1/21/2021   Activity/Exercise Parameters Parameters Parameters    --- 3 x 10 with heat 2 x 10    Wrist flexion/ extension 3x10 reps with 1lb wt. & 3x10 reps yellow ball 2 lb, 2 x 10 each  Red therabar 2 x 10 each 2 lbs, 3 x 10  Red therabar 2 x 10 each   Elbow flexion/ extension Green band, standing, 2 x 10 each Yellow, 2 x 10   Red band, standing, 3 x 10 each  3 lbs, 2 x 10   Pronation/ supination Yellow thera bar 3x10 reps  2 lb, 2 x 10 each Red therabar 2 x 10   Elbow extension stretch 1 lb prop x 2 mins  --- 3 lbs, 2 x 1 minute   UBE --- Level 2, 2 minutes forward, 2 minutes backwards Level 2, 3 minutes forward, 3 minutes backwards   Band row Green, 2 x 10     Band low row Green, 2 x 10       Time spent with patient reviewing proper muscle recruitment and technique with exercises. MANUAL THERAPY: (23 minutes): Joint mobilization and Soft tissue mobilization was utilized and necessary because of the patient's restricted joint motion, painful spasm, loss of articular motion and restricted motion of soft tissue   Soft tissue mobilization to R wrist extensor muscle belly   Cross friction massage over incision site to decrease tightness and adhesion   Gentle PROM with elbow extension.  Manual ROM and strength measurements    MODALITIES: (0 minutes):      none today      HEP: As above; handouts given to patient for all exercises. Treatment/Session Summary:    · Response to Treatment:  Patient with good understanding and performance of HEP. Safe for discharge to home program if released by MD today. · Baseline vitals (1/4/2021): BP: 138/88  · Communication/Consultation:  Reviewed HEP  · Equipment provided today:  None today  · Recommendations/Intent for next treatment session: Patient on hold until after MD appointment.     Total Treatment Billable Duration:  53 minutes  PT Patient Time In/Time Out  Time In: 0900  Time Out: Mike Fernandez 71, PT

## 2021-01-29 ENCOUNTER — APPOINTMENT (OUTPATIENT)
Dept: PHYSICAL THERAPY | Age: 51
End: 2021-01-29
Payer: MEDICARE

## 2021-01-29 NOTE — THERAPY DISCHARGE
[address] : 1970 [place] Secondary: [address] [facility] [address] at [address] [facility], [facility], [address] [place] [address] Street [phone]   [phone] OUTPATIENT PHYSICAL THERAPY:Discontinuation Summary 2021 ICD-10: Treatment Diagnosis: lateral epicondylitis, right elbow (M77.11) Treatment Diagnosis 2: pain in right elbow (M25.521) Treatment Diagnosis 3: stiffness of right elbow (M25.631) Precautions: Hypertension, sleep apnea, COPD, fibromyalgia Allergies: Patient has no known allergies. TREATMENT PLAN: 
Effective Dates: 2021 TO 3/5/2021 (60 days). Frequency/Duration: 2 times a week for 60 Day(s) MEDICAL/REFERRING DIAGNOSIS: 
Lateral epicondylitis, right elbow [M77.11] DATE OF ONSET: patient with chronic pain in R elbow for two years, no change with conservative treatments/ injections. Patient opted for surgical intervention and underwent right lateral epicondyle debridement and tendon reattachment on 2020. REFERRING PHYSICIAN: Laura Garcia NP MD Orders: Evaluate and Treat Return MD Appointment: 2021 INITIAL ASSESSMENT:  Ms. Jessica Coleman is a 52 y.o. female presenting to physical therapy with complaints of R elbow pain and stiffness s/p right lateral epicondyle debridement and tendon reattachment on 12/14/2020. Patient reports chronic elbow pain for about 2 years which did not resolve with conservative measures/ injections and opted for surgical intervention. Patient with minimal complaints since suture removal last week and rates her pain 0/10 at rest and up to 5/10 when trying to fix her hair. She does have some soreness in her L UE from compensating and has a history of chronic back and neck pain. Patient is eager to improve her overall mobility, ROM, and strength in order to return to prior level of independence and function. Patient presents with increased pain, decreased strength, decreased ROM, decreased flexibility, impaired gait, impaired posture, impaired overhead reach, impaired transfer ability, decreased activity tolerance, and overall impaired functional mobility. Patient is a good candidate for skilled physical therapy interventions to include manual therapy, therapeutic exercise, transfer training, postural re-education, body mechanics training, and pain modalities as needed. DISCONTINUATION 1/29/2021: Patient was seen for 8 sessions of physical therapy from 1/4/2021 to 1/26/2021. She reported feeling 100% better since starting therapy with less pain, improved ROM, being able to use it at home, and no pain. She had improved with strength, ROM, and overall activity tolerance. She met all of her goals with no complaints. Patient to see MD after last session and was safe for discharge to home program if released by MD. Patient called to say she was released and is discharged at this time. PROBLEM LIST (Impacting functional limitations): 1. Decreased Strength 2. Decreased ADL/Functional Activities 3. Increased Pain 4. Decreased Activity Tolerance 5. Decreased Pacing Skills 6. Decreased Flexibility/Joint Mobility INTERVENTIONS PLANNED: (Treatment may consist of any combination of the following) 1. Cold 2. Cryotherapy 3. Family Education 4. Heat 5. Home Exercise Program (HEP) 6. Manual Therapy 7. Neuromuscular Re-education/Strengthening 8. Range of Motion (ROM) 9. Therapeutic Activites 10. Therapeutic Exercise/Strengthening 11. Ultrasound (US)  
 
GOALS: (Goals have been discussed and agreed upon with patient.) Short-Term Functional Goals: Time Frame: 1/4/2021 to 2/4/2021 1. Patient demonstrates independence with home exercise program without verbal cueing provided by therapist. -GOAL MET 2. Patient will report no more than 3/10 R elbow pain with ADLs in order to demonstrate improved self pain control and tolerance. -GOAL MET 3. Patient will exhibit no more than 1 cm circumferential difference between R and L elbow in order to demonstrate improved edema control. -GOAL MET Discharge Goals: Time Frame: 1/4/2021 to 3/5/2021 1. Patient will improve R elbow ROM to 0-135 degrees in order to demonstrate full ROM. -GOAL MET 2. Patient will improve R wrist flexion and extension by 15 degrees total in order to demonstrate improved tissue and joint mobility. -GOAL MET 3. Patient will improve gross R UE strength to at least 4+/5 in order to return to prior level of activities. -GOAL MET 4. Patient improve R wrist radial deviation to at least 25 degrees in order to restore full symmetrical ROM. -GOAL MET 5. Patient will report minimal to no complaints of R elbow pain with daily activities, including fixing her hair, in order to return to prior independence and functional level. -GOAL MET 6. Patient will improve Disability of the Arm, Shoulder, and Hand score to 14/55 from 19/55. -GOAL MET Outcome Measure: Tool Used: Disabilities of the Arm, Shoulder and Hand (DASH) Questionnaire - Quick Version Score:  Initial: 19/55  Most Recent: 11/55 (Date: 1/26/2021 ) Interpretation of Score: The DASH is designed to measure the activities of daily living in person's with upper extremity dysfunction or pain. Each section is scored on a 1-5 scale, 5 representing the greatest disability. The scores of each section are added together for a total score of 55. UPDATED OBJECTIVE FINDINGS: (from 1/26/2021) Palpation:   
      Minimal (from moderate) tenderness to palpation of wrist extensor muscle belly with moderate tightness noted. Minimal tenderness over incision site with moderate adhesions noted. Minimal (from moderate) tightness and tenderness to palpation of R brachioradialis. Circumferential measurement: 26.7 L elbow, 27.5 R elbow ROM:   
AROM/ PROM Left (degrees) Right (degrees) Elbow Flexion 140 140 (from 124) Elbow Extension 3 0 (from lacking 15) Pronation 25 30 (from 20) Supination 30 45 (from 20) Wrist Flexion 95 115 (from 85) Wrist Extension 65 80 (from 60) Strength: Motion Tested Left   (*/5) Right  (*/5) Shoulder Flexion 5 4+ (from 4) Scaption 5 5 (from 4+) Shoulder Internal Rotation  5 5 Shoulder External Rotation 5 5 Elbow Flexion 5 5 (from 4+) Elbow Extension 5 5 (from 4+) Wrist Flexion 5 5 Wrist Extension 5 5 (from 4) Functional Mobility:  
      Patient with decreased strength, pain with fixing her hair, having to use L UE to walk the dog, difficulty cooking/ housework. 1/26/2021: Patient with improved strength, no problems fixing hair, no problems walking the dog, no difficulty with cooking or housework. Reason for Services/Other Comments: 
· Patient discharged to home program. 
 
Rehabilitation Potential For Stated Goals: Good Regarding [facility] Chavarria's therapy, I certify that the treatment plan above will be carried out by a therapist or under their direction. Thank you for this referral, Elsie Harper, PT

## 2021-02-02 ENCOUNTER — APPOINTMENT (OUTPATIENT)
Dept: PHYSICAL THERAPY | Age: 51
End: 2021-02-02

## 2021-02-05 ENCOUNTER — APPOINTMENT (OUTPATIENT)
Dept: PHYSICAL THERAPY | Age: 51
End: 2021-02-05

## 2021-02-05 PROBLEM — M77.11 LATERAL EPICONDYLITIS, RIGHT ELBOW: Status: ACTIVE | Noted: 2021-01-26

## 2021-02-09 ENCOUNTER — APPOINTMENT (OUTPATIENT)
Dept: PHYSICAL THERAPY | Age: 51
End: 2021-02-09

## 2021-02-12 ENCOUNTER — APPOINTMENT (OUTPATIENT)
Dept: PHYSICAL THERAPY | Age: 51
End: 2021-02-12

## 2021-02-16 ENCOUNTER — APPOINTMENT (OUTPATIENT)
Dept: PHYSICAL THERAPY | Age: 51
End: 2021-02-16

## 2021-02-19 ENCOUNTER — APPOINTMENT (OUTPATIENT)
Dept: PHYSICAL THERAPY | Age: 51
End: 2021-02-19

## 2021-02-23 ENCOUNTER — APPOINTMENT (OUTPATIENT)
Dept: PHYSICAL THERAPY | Age: 51
End: 2021-02-23

## 2021-02-26 ENCOUNTER — APPOINTMENT (OUTPATIENT)
Dept: PHYSICAL THERAPY | Age: 51
End: 2021-02-26

## 2022-03-04 PROBLEM — Z71.3 WEIGHT LOSS COUNSELING, ENCOUNTER FOR: Status: ACTIVE | Noted: 2022-03-04

## 2022-03-04 PROBLEM — I10 ESSENTIAL HYPERTENSION: Status: ACTIVE | Noted: 2022-03-04

## 2022-03-04 PROBLEM — R60.9 EDEMA: Status: ACTIVE | Noted: 2022-03-04

## 2022-03-18 PROBLEM — R60.9 EDEMA: Status: ACTIVE | Noted: 2022-03-04

## 2022-03-18 PROBLEM — M77.11 LATERAL EPICONDYLITIS, RIGHT ELBOW: Status: ACTIVE | Noted: 2021-01-26

## 2022-03-18 PROBLEM — Z71.6 ENCOUNTER FOR SMOKING CESSATION COUNSELING: Status: ACTIVE | Noted: 2020-03-04

## 2022-03-19 PROBLEM — Z09 S/P UMBILICAL HERNIA REPAIR, FOLLOW-UP EXAM: Status: ACTIVE | Noted: 2019-09-23

## 2022-03-19 PROBLEM — Z71.3 WEIGHT LOSS COUNSELING, ENCOUNTER FOR: Status: ACTIVE | Noted: 2022-03-04

## 2022-03-19 PROBLEM — K42.9 UMBILICAL HERNIA WITHOUT OBSTRUCTION AND WITHOUT GANGRENE: Status: ACTIVE | Noted: 2019-09-05

## 2022-03-19 PROBLEM — E66.01 SEVERE OBESITY (HCC): Status: ACTIVE | Noted: 2019-03-04

## 2022-03-19 PROBLEM — I10 ESSENTIAL HYPERTENSION: Status: ACTIVE | Noted: 2022-03-04

## 2022-03-19 PROBLEM — K42.9 RECURRENT UMBILICAL HERNIA: Status: ACTIVE | Noted: 2019-09-03

## 2022-03-19 PROBLEM — R21 RASH AND NONSPECIFIC SKIN ERUPTION: Status: ACTIVE | Noted: 2019-09-03

## 2022-03-20 PROBLEM — J44.9 CHRONIC OBSTRUCTIVE PULMONARY DISEASE (HCC): Status: ACTIVE | Noted: 2019-09-03

## 2022-06-06 ENCOUNTER — TELEPHONE (OUTPATIENT)
Dept: PRIMARY CARE CLINIC | Facility: CLINIC | Age: 52
End: 2022-06-06

## 2022-06-06 DIAGNOSIS — R94.6 NONSPECIFIC ABNORMAL RESULTS OF THYROID FUNCTION STUDY: ICD-10-CM

## 2022-06-06 DIAGNOSIS — Z13.228 SCREENING FOR METABOLIC DISORDER: ICD-10-CM

## 2022-06-06 DIAGNOSIS — R79.0 CALCIUM BLOOD DECREASED: Primary | ICD-10-CM

## 2022-06-06 DIAGNOSIS — E78.5 HYPERLIPIDEMIA, UNSPECIFIED HYPERLIPIDEMIA TYPE: ICD-10-CM

## 2022-06-06 NOTE — TELEPHONE ENCOUNTER
Patient  Needs labs ordered she is already scheduled for her follow up, but she said Dr. Radha Heck wanted labs. Her follow up appointment is 08/30/2022. She can be reached at [phone].

## 2022-06-07 ENCOUNTER — TELEPHONE (OUTPATIENT)
Dept: PRIMARY CARE CLINIC | Facility: CLINIC | Age: 52
End: 2022-06-07

## 2022-08-23 DIAGNOSIS — R94.6 NONSPECIFIC ABNORMAL RESULTS OF THYROID FUNCTION STUDY: ICD-10-CM

## 2022-08-23 DIAGNOSIS — E78.5 HYPERLIPIDEMIA, UNSPECIFIED HYPERLIPIDEMIA TYPE: ICD-10-CM

## 2022-08-23 DIAGNOSIS — Z13.228 SCREENING FOR METABOLIC DISORDER: ICD-10-CM

## 2022-08-23 DIAGNOSIS — R79.0 CALCIUM BLOOD DECREASED: ICD-10-CM

## 2022-08-23 LAB
ALBUMIN SERPL-MCNC: 3.8 G/DL (ref 3.5–5)
ALBUMIN/GLOB SERPL: 1.2 {RATIO} (ref 1.2–3.5)
ALP SERPL-CCNC: 111 U/L (ref 50–136)
ALT SERPL-CCNC: 22 U/L (ref 12–65)
ANION GAP SERPL CALC-SCNC: 2 MMOL/L (ref 7–16)
AST SERPL-CCNC: 13 U/L (ref 15–37)
BASOPHILS # BLD: 0.1 K/UL (ref 0–0.2)
BASOPHILS NFR BLD: 1 % (ref 0–2)
BILIRUB SERPL-MCNC: 0.2 MG/DL (ref 0.2–1.1)
BUN SERPL-MCNC: 12 MG/DL (ref 6–23)
CALCIUM SERPL-MCNC: 8.9 MG/DL (ref 8.3–10.4)
CHLORIDE SERPL-SCNC: 107 MMOL/L (ref 98–107)
CHOLEST SERPL-MCNC: 160 MG/DL
CO2 SERPL-SCNC: 27 MMOL/L (ref 21–32)
CREAT SERPL-MCNC: 0.7 MG/DL (ref 0.6–1)
DIFFERENTIAL METHOD BLD: ABNORMAL
EOSINOPHIL # BLD: 0 K/UL (ref 0–0.8)
EOSINOPHIL NFR BLD: 0 % (ref 0.5–7.8)
ERYTHROCYTE [DISTWIDTH] IN BLOOD BY AUTOMATED COUNT: 14.2 % (ref 11.9–14.6)
GLOBULIN SER CALC-MCNC: 3.1 G/DL (ref 2.3–3.5)
GLUCOSE SERPL-MCNC: 131 MG/DL (ref 65–100)
HCT VFR BLD AUTO: 48.1 % (ref 35.8–46.3)
HDLC SERPL-MCNC: 37 MG/DL (ref 40–60)
HDLC SERPL: 4.3 {RATIO}
HGB BLD-MCNC: 15.1 G/DL (ref 11.7–15.4)
IMM GRANULOCYTES # BLD AUTO: 0.1 K/UL (ref 0–0.5)
IMM GRANULOCYTES NFR BLD AUTO: 1 % (ref 0–5)
LDLC SERPL CALC-MCNC: 74.8 MG/DL
LYMPHOCYTES # BLD: 3.2 K/UL (ref 0.5–4.6)
LYMPHOCYTES NFR BLD: 29 % (ref 13–44)
MCH RBC QN AUTO: 30.1 PG (ref 26.1–32.9)
MCHC RBC AUTO-ENTMCNC: 31.4 G/DL (ref 31.4–35)
MCV RBC AUTO: 96 FL (ref 79.6–97.8)
MONOCYTES # BLD: 0.7 K/UL (ref 0.1–1.3)
MONOCYTES NFR BLD: 6 % (ref 4–12)
NEUTS SEG # BLD: 7.1 K/UL (ref 1.7–8.2)
NEUTS SEG NFR BLD: 63 % (ref 43–78)
NRBC # BLD: 0 K/UL (ref 0–0.2)
PLATELET # BLD AUTO: 193 K/UL (ref 150–450)
PMV BLD AUTO: 11.1 FL (ref 9.4–12.3)
POTASSIUM SERPL-SCNC: 4 MMOL/L (ref 3.5–5.1)
PROT SERPL-MCNC: 6.9 G/DL (ref 6.3–8.2)
RBC # BLD AUTO: 5.01 M/UL (ref 4.05–5.2)
SODIUM SERPL-SCNC: 136 MMOL/L (ref 136–145)
TRIGL SERPL-MCNC: 241 MG/DL (ref 35–150)
VLDLC SERPL CALC-MCNC: 48.2 MG/DL (ref 6–23)
WBC # BLD AUTO: 11.1 K/UL (ref 4.3–11.1)

## 2022-08-26 LAB — T4 FREE SERPL-MCNC: 0.7 NG/DL (ref 0.78–1.46)

## 2022-08-27 DIAGNOSIS — E06.3 HYPOTHYROIDISM DUE TO HASHIMOTO'S THYROIDITIS: Primary | ICD-10-CM

## 2022-08-27 DIAGNOSIS — E03.8 HYPOTHYROIDISM DUE TO HASHIMOTO'S THYROIDITIS: Primary | ICD-10-CM

## 2022-08-27 RX ORDER — LEVOTHYROXINE SODIUM 0.03 MG/1
25 TABLET ORAL DAILY
Qty: 90 TABLET | Refills: 1 | Status: SHIPPED | OUTPATIENT
Start: 2022-08-27 | End: 2022-08-30 | Stop reason: SDUPTHER

## 2022-08-30 ENCOUNTER — OFFICE VISIT (OUTPATIENT)
Dept: PRIMARY CARE CLINIC | Facility: CLINIC | Age: 52
End: 2022-08-30
Payer: MEDICARE

## 2022-08-30 VITALS
HEIGHT: 62 IN | BODY MASS INDEX: 38.39 KG/M2 | HEART RATE: 75 BPM | OXYGEN SATURATION: 94 % | SYSTOLIC BLOOD PRESSURE: 114 MMHG | TEMPERATURE: 98 F | DIASTOLIC BLOOD PRESSURE: 73 MMHG | WEIGHT: 208.6 LBS

## 2022-08-30 DIAGNOSIS — F51.01 PRIMARY INSOMNIA: ICD-10-CM

## 2022-08-30 DIAGNOSIS — E06.3 HYPOTHYROIDISM DUE TO HASHIMOTO'S THYROIDITIS: ICD-10-CM

## 2022-08-30 DIAGNOSIS — E78.2 ELEVATED TRIGLYCERIDES WITH HIGH CHOLESTEROL: ICD-10-CM

## 2022-08-30 DIAGNOSIS — K21.9 GASTROESOPHAGEAL REFLUX DISEASE WITHOUT ESOPHAGITIS: ICD-10-CM

## 2022-08-30 DIAGNOSIS — J44.9 CHRONIC OBSTRUCTIVE PULMONARY DISEASE, UNSPECIFIED COPD TYPE (HCC): ICD-10-CM

## 2022-08-30 DIAGNOSIS — E78.2 MIXED HYPERLIPIDEMIA: ICD-10-CM

## 2022-08-30 DIAGNOSIS — Z12.11 COLON CANCER SCREENING: ICD-10-CM

## 2022-08-30 DIAGNOSIS — E03.8 HYPOTHYROIDISM DUE TO HASHIMOTO'S THYROIDITIS: ICD-10-CM

## 2022-08-30 DIAGNOSIS — I10 ESSENTIAL HYPERTENSION: Primary | ICD-10-CM

## 2022-08-30 PROCEDURE — 3023F SPIROM DOC REV: CPT | Performed by: FAMILY MEDICINE

## 2022-08-30 PROCEDURE — G8427 DOCREV CUR MEDS BY ELIG CLIN: HCPCS | Performed by: FAMILY MEDICINE

## 2022-08-30 PROCEDURE — G8417 CALC BMI ABV UP PARAM F/U: HCPCS | Performed by: FAMILY MEDICINE

## 2022-08-30 PROCEDURE — G0328 FECAL BLOOD SCRN IMMUNOASSAY: HCPCS | Performed by: FAMILY MEDICINE

## 2022-08-30 PROCEDURE — 99214 OFFICE O/P EST MOD 30 MIN: CPT | Performed by: FAMILY MEDICINE

## 2022-08-30 PROCEDURE — 4004F PT TOBACCO SCREEN RCVD TLK: CPT | Performed by: FAMILY MEDICINE

## 2022-08-30 RX ORDER — ICOSAPENT ETHYL 1000 MG/1
1 CAPSULE ORAL 2 TIMES DAILY WITH MEALS
Qty: 180 CAPSULE | Refills: 1 | Status: SHIPPED | OUTPATIENT
Start: 2022-08-30

## 2022-08-30 RX ORDER — LEVOTHYROXINE SODIUM 0.03 MG/1
25 TABLET ORAL DAILY
Qty: 90 TABLET | Refills: 1 | Status: SHIPPED | OUTPATIENT
Start: 2022-08-30

## 2022-08-30 RX ORDER — ASPIRIN 81 MG/1
81 TABLET ORAL DAILY
Qty: 30 TABLET | Status: CANCELLED | OUTPATIENT
Start: 2022-08-30

## 2022-08-30 RX ORDER — BUPROPION HYDROCHLORIDE 100 MG/1
100 TABLET, EXTENDED RELEASE ORAL DAILY
Qty: 60 TABLET | Status: CANCELLED | OUTPATIENT
Start: 2022-08-30

## 2022-08-30 RX ORDER — OMEPRAZOLE 40 MG/1
40 CAPSULE, DELAYED RELEASE ORAL DAILY
Qty: 90 CAPSULE | Refills: 1 | Status: SHIPPED | OUTPATIENT
Start: 2022-08-30

## 2022-08-30 RX ORDER — ZOLPIDEM TARTRATE 10 MG/1
10 TABLET ORAL NIGHTLY PRN
Qty: 30 TABLET | Refills: 2 | Status: SHIPPED | OUTPATIENT
Start: 2022-08-30 | End: 2022-09-29

## 2022-08-30 RX ORDER — HYDROCHLOROTHIAZIDE 25 MG/1
25 TABLET ORAL DAILY
Qty: 90 TABLET | Refills: 1 | Status: SHIPPED | OUTPATIENT
Start: 2022-08-30

## 2022-08-30 RX ORDER — ATORVASTATIN CALCIUM 20 MG/1
20 TABLET, FILM COATED ORAL DAILY
Qty: 90 TABLET | Refills: 1 | Status: SHIPPED | OUTPATIENT
Start: 2022-08-30

## 2022-08-30 ASSESSMENT — PATIENT HEALTH QUESTIONNAIRE - PHQ9
SUM OF ALL RESPONSES TO PHQ QUESTIONS 1-9: 0
1. LITTLE INTEREST OR PLEASURE IN DOING THINGS: 0
2. FEELING DOWN, DEPRESSED OR HOPELESS: 0
SUM OF ALL RESPONSES TO PHQ9 QUESTIONS 1 & 2: 0
SUM OF ALL RESPONSES TO PHQ QUESTIONS 1-9: 0

## 2022-08-30 NOTE — PROGRESS NOTES
Carmelo Hoffmann MD  [address] [facility] [address] [place], [place] 58  Ph No:  [phone]  Fax:  [phone]:  Chief Complaint   Patient presents with    Follow-up     Pt in for 3 month follow up. Pt has no issues today. HISTORY OF PRESENT ILLNESS:  Ms. Juni Eid is a 54 y.o. female. Pt presents for recheck, 3 months follow up visit. Pt says she does not have any particular medical issues. Pt is reviewed as to most recent labs, with fasting glucose 131, but hgba1c not done, last check prior 6.2%. Pt will need to have hgba1c next visit to confirm glucose is still is good control. Other labs as follows:  GFR >60, so healthy kidney function. Liver enzymes are ast 22 and alt 13. Normal.    Pt's total cholestero is 160, with hdl 37 ldl 75, ratio or risk 4.3 (high normal). Pt is advised triglycerides contine to be high 241, may need reduction of fat, more fiber, pt is already taking Vascepa with good results, much improved from very high readings. Pt is advised thyroid, (free t4 is low at 0.7, shows need for adding 25mcg of synthroid, to recheck in 3 months,   hgb is 15.1 and stable. BP is stable 114/73, continue current therapy    HISTORY:  No Known Allergies  Past Medical History:   Diagnosis Date    Arthritis     osteo    Chronic back pain     Cigarette smoker     COPD (chronic obstructive pulmonary disease) (HCC)     prn inhaler-    Esophageal reflux     prn med    Fibromyalgia     High triglycerides     managed well with meds    Insomnia     Mixed hyperlipidemia 2014    Right elbow pain     Sleep apnea 2021     Past Surgical History:   Procedure Laterality Date    APPENDECTOMY       SECTION      X2    COLONOSCOPY      HERNIA REPAIR      umbilical    HYSTERECTOMY      AGE 37?      Family History   Problem Relation Age of Onset    Heart Attack Father     Cancer Mother         Lung CA    Diabetes Father     Diabetes Mother Social History     Socioeconomic History    Marital status:      Spouse name: Not on file    Number of children: Not on file    Years of education: Not on file    Highest education level: Not on file   Occupational History    Not on file   Tobacco Use    Smoking status: Every Day     Packs/day: 0.50     Types: Cigarettes    Smokeless tobacco: Never   Substance and Sexual Activity    Alcohol use: No    Drug use: No    Sexual activity: Not on file   Other Topics Concern    Not on file   Social History Narrative    Not on file     Social Determinants of Health     Financial Resource Strain: Not on file   Food Insecurity: Not on file   Transportation Needs: Not on file   Physical Activity: Not on file   Stress: Not on file   Social Connections: Not on file   Intimate Partner Violence: Not on file   Housing Stability: Not on file     Current Outpatient Medications   Medication Sig Dispense Refill    levothyroxine (SYNTHROID) 25 MCG tablet Take 1 tablet by mouth daily 90 tablet 1    zolpidem (AMBIEN) 10 MG tablet Take 1 tablet by mouth nightly as needed for Sleep for up to 30 days. 30 tablet 2    omeprazole (PRILOSEC) 40 MG delayed release capsule Take 1 capsule by mouth daily 90 capsule 1    Icosapent Ethyl (VASCEPA) 1 g CAPS capsule Take 1 capsule by mouth 2 times daily (with meals) 180 capsule 1    atorvastatin (LIPITOR) 20 MG tablet Take 1 tablet by mouth daily 90 tablet 1    hydroCHLOROthiazide (HYDRODIURIL) 25 MG tablet Take 1 tablet by mouth daily 90 tablet 1    albuterol sulfate  (90 Base) MCG/ACT inhaler Inhale 2 puffs into the lungs every 6 hours as needed      aspirin 81 MG EC tablet Take 81 mg by mouth daily      buPROPion (WELLBUTRIN SR) 100 MG extended release tablet Take 100 mg by mouth daily      HYDROcodone-acetaminophen (NORCO) 7.5-325 MG per tablet Take 1 tablet by mouth 3 times daily.       lisinopril-hydroCHLOROthiazide (PRINZIDE;ZESTORETIC) 10-12.5 MG per tablet Take 1 tablet by mouth daily      pregabalin (LYRICA) 150 MG capsule Take 150 mg by mouth 3 times daily. No current facility-administered medications for this visit. REVIEW OF SYSTEMS:  Review of systems is as indicated in HPI otherwise negative. PHYSICAL EXAM:  Vital Signs - /73 (Site: Left Upper Arm, Position: Sitting, Cuff Size: Large Adult)   Pulse 75   Temp 98 °F (36.7 °C) (Temporal)   Ht 5' 2\" (1.575 m)   Wt 208 lb 9.6 oz (94.6 kg)   SpO2 94%   BMI 38.15 kg/m²      Physical Exam  Vitals and nursing note reviewed. Constitutional:       Appearance: Normal appearance. She is obese. Comments: See HPI, pt reports no medical concerns   HENT:      Head: Normocephalic and atraumatic. Nose: Nose normal.      Mouth/Throat:      Mouth: Mucous membranes are moist.      Pharynx: Oropharynx is clear. Eyes:      Extraocular Movements: Extraocular movements intact. Conjunctiva/sclera: Conjunctivae normal.      Pupils: Pupils are equal, round, and reactive to light. Cardiovascular:      Rate and Rhythm: Normal rate and regular rhythm. Pulses: Normal pulses. Heart sounds: Normal heart sounds. Pulmonary:      Effort: Pulmonary effort is normal.      Comments: Coarse breath sounds, but otherwise mostly clear to ascultation bilaterally  Abdominal:      General: Bowel sounds are normal.      Palpations: Abdomen is soft. Comments: Obese, BMI 38.15, weight 208lbs   Musculoskeletal:         General: Normal range of motion. Cervical back: Normal range of motion and neck supple. Skin:     General: Skin is warm and dry. Capillary Refill: Capillary refill takes 2 to 3 seconds. Neurological:      General: No focal deficit present. Mental Status: She is alert and oriented to person, place, and time. Psychiatric:         Mood and Affect: Mood normal.         Behavior: Behavior normal.         Thought Content:  Thought content normal.         Judgment: Judgment normal. Comments: General health concerns           LABS  No results found for this visit on 08/30/22. IMPRESSION/PLAN     Diagnosis Orders   1. Essential hypertension  hydroCHLOROthiazide (HYDRODIURIL) 25 MG tablet      2. Hypothyroidism due to Hashimoto's thyroiditis  levothyroxine (SYNTHROID) 25 MCG tablet      3. Chronic obstructive pulmonary disease, unspecified COPD type ([facility] [place] [facility]        4. Gastroesophageal reflux disease without esophagitis  omeprazole (PRILOSEC) 40 MG delayed release capsule      5. Mixed hyperlipidemia  Icosapent Ethyl (VASCEPA) 1 g CAPS capsule    atorvastatin (LIPITOR) 20 MG tablet      6. Primary insomnia  zolpidem (AMBIEN) 10 MG tablet      7. Colon cancer screening  POCT FECAL IMMUNOCHEMICAL TEST (FIT) ()      8. Elevated triglycerides with high cholesterol  Icosapent Ethyl (VASCEPA) 1 g CAPS capsule    atorvastatin (LIPITOR) 20 MG tablet          No follow-up provider specified. Thomas Carmona MD            Dictated using voice recognition software.  Proofread, but unrecognized voice recognition errors may exist.

## 2022-08-31 PROBLEM — Z12.11 COLON CANCER SCREENING: Status: ACTIVE | Noted: 2022-08-31

## 2022-08-31 PROBLEM — E06.3 HYPOTHYROIDISM DUE TO HASHIMOTO'S THYROIDITIS: Status: ACTIVE | Noted: 2022-08-31

## 2022-08-31 PROBLEM — E03.8 HYPOTHYROIDISM DUE TO HASHIMOTO'S THYROIDITIS: Status: ACTIVE | Noted: 2022-08-31

## 2022-09-21 DIAGNOSIS — J44.9 CHRONIC OBSTRUCTIVE PULMONARY DISEASE, UNSPECIFIED (HCC): ICD-10-CM

## 2022-09-21 RX ORDER — ALBUTEROL SULFATE 90 UG/1
AEROSOL, METERED RESPIRATORY (INHALATION)
Qty: 6.7 EACH | Refills: 5 | OUTPATIENT
Start: 2022-09-21

## 2022-09-27 LAB
HEMOCCULT STL QL: NEGATIVE
VALID INTERNAL CONTROL: NORMAL

## 2022-09-30 PROBLEM — Z12.11 COLON CANCER SCREENING: Status: RESOLVED | Noted: 2022-08-31 | Resolved: 2022-09-30

## 2022-11-16 ENCOUNTER — TELEPHONE (OUTPATIENT)
Dept: PRIMARY CARE CLINIC | Facility: CLINIC | Age: 52
End: 2022-11-16

## 2022-11-16 DIAGNOSIS — R53.82 CHRONIC FATIGUE: ICD-10-CM

## 2022-11-16 DIAGNOSIS — R79.9 ABNORMAL BLOOD CHEMISTRY: Primary | ICD-10-CM

## 2022-11-16 DIAGNOSIS — Z13.6 SCREENING FOR ISCHEMIC HEART DISEASE: ICD-10-CM

## 2022-11-16 DIAGNOSIS — Z13.228 SCREENING FOR METABOLIC DISORDER: ICD-10-CM

## 2022-11-16 RX ORDER — ALBUTEROL SULFATE 90 UG/1
AEROSOL, METERED RESPIRATORY (INHALATION)
Qty: 6.7 EACH | Refills: 5 | OUTPATIENT
Start: 2022-11-16

## 2022-11-25 DIAGNOSIS — I10 ESSENTIAL (PRIMARY) HYPERTENSION: ICD-10-CM

## 2022-11-29 DIAGNOSIS — Z13.6 SCREENING FOR ISCHEMIC HEART DISEASE: ICD-10-CM

## 2022-11-29 DIAGNOSIS — R79.9 ABNORMAL BLOOD CHEMISTRY: ICD-10-CM

## 2022-11-29 DIAGNOSIS — Z13.228 SCREENING FOR METABOLIC DISORDER: ICD-10-CM

## 2022-11-29 DIAGNOSIS — R53.82 CHRONIC FATIGUE: ICD-10-CM

## 2022-11-29 LAB
ALBUMIN SERPL-MCNC: 3.9 G/DL (ref 3.5–5)
ALBUMIN/GLOB SERPL: 1.2 {RATIO} (ref 0.4–1.6)
ALP SERPL-CCNC: 100 U/L (ref 50–136)
ALT SERPL-CCNC: 28 U/L (ref 12–65)
ANION GAP SERPL CALC-SCNC: 9 MMOL/L (ref 2–11)
AST SERPL-CCNC: 12 U/L (ref 15–37)
BASOPHILS # BLD: 0.1 K/UL (ref 0–0.2)
BASOPHILS NFR BLD: 1 % (ref 0–2)
BILIRUB SERPL-MCNC: 0.4 MG/DL (ref 0.2–1.1)
BUN SERPL-MCNC: 20 MG/DL (ref 6–23)
CALCIUM SERPL-MCNC: 9.1 MG/DL (ref 8.3–10.4)
CHLORIDE SERPL-SCNC: 103 MMOL/L (ref 101–110)
CHOLEST SERPL-MCNC: 191 MG/DL
CO2 SERPL-SCNC: 28 MMOL/L (ref 21–32)
CREAT SERPL-MCNC: 0.7 MG/DL (ref 0.6–1)
DIFFERENTIAL METHOD BLD: ABNORMAL
EOSINOPHIL # BLD: 0.2 K/UL (ref 0–0.8)
EOSINOPHIL NFR BLD: 2 % (ref 0.5–7.8)
ERYTHROCYTE [DISTWIDTH] IN BLOOD BY AUTOMATED COUNT: 13.8 % (ref 11.9–14.6)
GLOBULIN SER CALC-MCNC: 3.2 G/DL (ref 2.8–4.5)
GLUCOSE SERPL-MCNC: 139 MG/DL (ref 65–100)
HCT VFR BLD AUTO: 50.1 % (ref 35.8–46.3)
HDLC SERPL-MCNC: 34 MG/DL (ref 40–60)
HDLC SERPL: 5.6 {RATIO}
HGB BLD-MCNC: 15.5 G/DL (ref 11.7–15.4)
IMM GRANULOCYTES # BLD AUTO: 0 K/UL (ref 0–0.5)
IMM GRANULOCYTES NFR BLD AUTO: 0 % (ref 0–5)
LDLC SERPL CALC-MCNC: ABNORMAL MG/DL
LDLC SERPL DIRECT ASSAY-MCNC: 98 MG/DL
LYMPHOCYTES # BLD: 2.4 K/UL (ref 0.5–4.6)
LYMPHOCYTES NFR BLD: 23 % (ref 13–44)
MCH RBC QN AUTO: 30 PG (ref 26.1–32.9)
MCHC RBC AUTO-ENTMCNC: 30.9 G/DL (ref 31.4–35)
MCV RBC AUTO: 96.9 FL (ref 82–102)
MONOCYTES # BLD: 0.7 K/UL (ref 0.1–1.3)
MONOCYTES NFR BLD: 7 % (ref 4–12)
NEUTS SEG # BLD: 6.9 K/UL (ref 1.7–8.2)
NEUTS SEG NFR BLD: 67 % (ref 43–78)
NRBC # BLD: 0 K/UL (ref 0–0.2)
PLATELET # BLD AUTO: 201 K/UL (ref 150–450)
PMV BLD AUTO: 11.2 FL (ref 9.4–12.3)
POTASSIUM SERPL-SCNC: 4.3 MMOL/L (ref 3.5–5.1)
PROT SERPL-MCNC: 7.1 G/DL (ref 6.3–8.2)
RBC # BLD AUTO: 5.17 M/UL (ref 4.05–5.2)
SODIUM SERPL-SCNC: 140 MMOL/L (ref 133–143)
T4 FREE SERPL-MCNC: 0.8 NG/DL (ref 0.78–1.46)
TRIGL SERPL-MCNC: 503 MG/DL (ref 35–150)
TSH, 3RD GENERATION: 5.05 UIU/ML (ref 0.36–3.74)
VLDLC SERPL CALC-MCNC: 100.6 MG/DL (ref 6–23)
WBC # BLD AUTO: 10.3 K/UL (ref 4.3–11.1)

## 2022-12-05 ENCOUNTER — OFFICE VISIT (OUTPATIENT)
Dept: PRIMARY CARE CLINIC | Facility: CLINIC | Age: 52
End: 2022-12-05
Payer: MEDICARE

## 2022-12-05 VITALS
TEMPERATURE: 98 F | SYSTOLIC BLOOD PRESSURE: 107 MMHG | BODY MASS INDEX: 37.54 KG/M2 | DIASTOLIC BLOOD PRESSURE: 85 MMHG | OXYGEN SATURATION: 96 % | HEIGHT: 62 IN | WEIGHT: 204 LBS | HEART RATE: 85 BPM

## 2022-12-05 DIAGNOSIS — E06.3 HYPOTHYROIDISM DUE TO HASHIMOTO'S THYROIDITIS: ICD-10-CM

## 2022-12-05 DIAGNOSIS — K21.9 GASTROESOPHAGEAL REFLUX DISEASE WITHOUT ESOPHAGITIS: ICD-10-CM

## 2022-12-05 DIAGNOSIS — Z72.0 TOBACCO ABUSE DISORDER: ICD-10-CM

## 2022-12-05 DIAGNOSIS — J41.1 MUCOPURULENT CHRONIC BRONCHITIS (HCC): Primary | ICD-10-CM

## 2022-12-05 DIAGNOSIS — I10 ESSENTIAL HYPERTENSION: ICD-10-CM

## 2022-12-05 DIAGNOSIS — F51.01 PRIMARY INSOMNIA: ICD-10-CM

## 2022-12-05 DIAGNOSIS — E78.2 ELEVATED TRIGLYCERIDES WITH HIGH CHOLESTEROL: ICD-10-CM

## 2022-12-05 DIAGNOSIS — Z12.31 SCREENING MAMMOGRAM FOR BREAST CANCER: ICD-10-CM

## 2022-12-05 DIAGNOSIS — E03.8 HYPOTHYROIDISM DUE TO HASHIMOTO'S THYROIDITIS: ICD-10-CM

## 2022-12-05 DIAGNOSIS — I10 PRIMARY HYPERTENSION: ICD-10-CM

## 2022-12-05 DIAGNOSIS — E78.2 MIXED HYPERLIPIDEMIA: ICD-10-CM

## 2022-12-05 PROCEDURE — G8417 CALC BMI ABV UP PARAM F/U: HCPCS | Performed by: FAMILY MEDICINE

## 2022-12-05 PROCEDURE — 99214 OFFICE O/P EST MOD 30 MIN: CPT | Performed by: FAMILY MEDICINE

## 2022-12-05 PROCEDURE — 4004F PT TOBACCO SCREEN RCVD TLK: CPT | Performed by: FAMILY MEDICINE

## 2022-12-05 PROCEDURE — 3078F DIAST BP <80 MM HG: CPT | Performed by: FAMILY MEDICINE

## 2022-12-05 PROCEDURE — 3017F COLORECTAL CA SCREEN DOC REV: CPT | Performed by: FAMILY MEDICINE

## 2022-12-05 PROCEDURE — G8427 DOCREV CUR MEDS BY ELIG CLIN: HCPCS | Performed by: FAMILY MEDICINE

## 2022-12-05 PROCEDURE — 3023F SPIROM DOC REV: CPT | Performed by: FAMILY MEDICINE

## 2022-12-05 PROCEDURE — G8484 FLU IMMUNIZE NO ADMIN: HCPCS | Performed by: FAMILY MEDICINE

## 2022-12-05 PROCEDURE — 3074F SYST BP LT 130 MM HG: CPT | Performed by: FAMILY MEDICINE

## 2022-12-05 RX ORDER — BUPROPION HYDROCHLORIDE 100 MG/1
100 TABLET, EXTENDED RELEASE ORAL DAILY
Qty: 60 TABLET | Status: CANCELLED | OUTPATIENT
Start: 2022-12-05

## 2022-12-05 RX ORDER — ICOSAPENT ETHYL 1000 MG/1
1 CAPSULE ORAL 2 TIMES DAILY WITH MEALS
Qty: 180 CAPSULE | Refills: 1 | Status: CANCELLED | OUTPATIENT
Start: 2022-12-05

## 2022-12-05 RX ORDER — LEVOTHYROXINE SODIUM 0.05 MG/1
50 TABLET ORAL DAILY
Qty: 90 TABLET | Refills: 1 | Status: SHIPPED | OUTPATIENT
Start: 2022-12-05

## 2022-12-05 RX ORDER — ATORVASTATIN CALCIUM 20 MG/1
20 TABLET, FILM COATED ORAL DAILY
Qty: 90 TABLET | Refills: 1 | Status: SHIPPED | OUTPATIENT
Start: 2022-12-05

## 2022-12-05 RX ORDER — HYDROCHLOROTHIAZIDE 25 MG/1
25 TABLET ORAL DAILY
Qty: 90 TABLET | Refills: 1 | Status: SHIPPED | OUTPATIENT
Start: 2022-12-05

## 2022-12-05 RX ORDER — ZOLPIDEM TARTRATE 10 MG/1
10 TABLET ORAL NIGHTLY PRN
Qty: 30 TABLET | Refills: 2 | Status: SHIPPED | OUTPATIENT
Start: 2022-12-05 | End: 2023-01-04

## 2022-12-05 RX ORDER — LEVOTHYROXINE SODIUM 0.03 MG/1
25 TABLET ORAL DAILY
Qty: 90 TABLET | Refills: 1 | Status: CANCELLED | OUTPATIENT
Start: 2022-12-05

## 2022-12-05 RX ORDER — ALBUTEROL SULFATE 90 UG/1
2 AEROSOL, METERED RESPIRATORY (INHALATION) EVERY 6 HOURS PRN
Qty: 18 G | Refills: 5 | Status: SHIPPED | OUTPATIENT
Start: 2022-12-05

## 2022-12-05 RX ORDER — FLUTICASONE PROPIONATE AND SALMETEROL 250; 50 UG/1; UG/1
1 POWDER RESPIRATORY (INHALATION) EVERY 12 HOURS
Qty: 60 EACH | Refills: 3 | Status: SHIPPED | OUTPATIENT
Start: 2022-12-05

## 2022-12-05 RX ORDER — OMEPRAZOLE 40 MG/1
40 CAPSULE, DELAYED RELEASE ORAL DAILY
Qty: 90 CAPSULE | Refills: 1 | Status: SHIPPED | OUTPATIENT
Start: 2022-12-05

## 2022-12-05 RX ORDER — LISINOPRIL AND HYDROCHLOROTHIAZIDE 12.5; 1 MG/1; MG/1
1 TABLET ORAL DAILY
Qty: 90 TABLET | Refills: 3 | Status: SHIPPED | OUTPATIENT
Start: 2022-12-05

## 2022-12-05 ASSESSMENT — PATIENT HEALTH QUESTIONNAIRE - PHQ9
SUM OF ALL RESPONSES TO PHQ QUESTIONS 1-9: 0
SUM OF ALL RESPONSES TO PHQ9 QUESTIONS 1 & 2: 0
2. FEELING DOWN, DEPRESSED OR HOPELESS: 0
SUM OF ALL RESPONSES TO PHQ QUESTIONS 1-9: 0
SUM OF ALL RESPONSES TO PHQ QUESTIONS 1-9: 0
1. LITTLE INTEREST OR PLEASURE IN DOING THINGS: 0
SUM OF ALL RESPONSES TO PHQ QUESTIONS 1-9: 0

## 2022-12-05 NOTE — PROGRESS NOTES
Tai Medrano MD  [address] [facility] [address] [place]  Ph No:  [phone]  Fax:  [phone]:  Chief Complaint   Patient presents with    Follow-up     Pt in for 3 month follow up. HISTORY OF PRESENT ILLNESS:  Ms. Artemio Ware is a 50 y.o. female. Pt presents for 3 months follow up. Pt reports is going to pain management, for chronic pain, fibromyalgia. Pt reports this has been helpful to manage her chronic pain. Pt is reviewed as to labs report (11/29/22):  Gfr>60, normal kidney function. Fasting glucose 129, hgba1c is not done, needed next lab review. Liver enzymes normal, alt 28, ast 12  Total cholesterol 191, hdl 34, ratio of cardiac risk 5.7 (high)  Triglycerides 503, vldl 100.6  Pt is given lipitor 20mg dose po daily. Pt is also taking vascepa. Recheck next lab review. Pt is to take more fiber into diet, less fat. Thyroid normal (low normal), tsh 5.050, free t4 0.8 (low normal). Synthroid is given at 50mcg dose (increase of dose). Hgb 15.5, mcv 96.9 pt will benefit from additional green leafy vegetables for folic acid. Pt reports she is having problems with breathing, congestion, chronic cough. Pt is referred to [facility] Pulmonology for additional testing, evaluation and treatmen, for tobacco abuse disorder with chronic bronchitis from smoking. Pt is aware she needs to quit smoking, counseled pt to start on reduction of number of cigarettes, discuss with pulmonology condition of lung field and next visit we can discuss her options for smoking cessation (after holiday season and stressful period). Pt is sent for screening mammogram.    Pt is given the following medication refils:  Omeprazole for acid reflux. Hydrochlorozide for BP and flued edema buildup lower extremities, with lisinopril/hctz for BP management. BP stable 107/85, continue current therapy.   Albuterol hfa and advair are given for lung congestion, chronic bronchitis, see above note, for lung clearance. Pt advised to reduce or stop smoking. Synthroid increased to 50mcg po daily due to lower free t4. Ambien is given for sleep. Pt says this is necessary for her to sleep well. Pt will RTC in 3 months, recheck, cmp, lipid, hgba1c        HISTORY:  No Known Allergies  Past Medical History:   Diagnosis Date    Arthritis     osteo    Chronic back pain     Cigarette smoker     COPD (chronic obstructive pulmonary disease) (HCC)     prn inhaler-    Esophageal reflux     prn med    Fibromyalgia     High triglycerides     managed well with meds    Insomnia     Mixed hyperlipidemia 2014    Right elbow pain     Sleep apnea 2021     Past Surgical History:   Procedure Laterality Date    APPENDECTOMY       SECTION      X2    COLONOSCOPY      HERNIA REPAIR      umbilical    HYSTERECTOMY (CERVIX STATUS UNKNOWN)      AGE 30?      Family History   Problem Relation Age of Onset    Heart Attack Father     Cancer Mother         Lung CA    Diabetes Father     Diabetes Mother      Social History     Socioeconomic History    Marital status:      Spouse name: Not on file    Number of children: Not on file    Years of education: Not on file    Highest education level: Not on file   Occupational History    Not on file   Tobacco Use    Smoking status: Every Day     Packs/day: 0.50     Types: Cigarettes    Smokeless tobacco: Never   Substance and Sexual Activity    Alcohol use: No    Drug use: No    Sexual activity: Not on file   Other Topics Concern    Not on file   Social History Narrative    Not on file     Social Determinants of Health     Financial Resource Strain: Not on file   Food Insecurity: Not on file   Transportation Needs: Not on file   Physical Activity: Not on file   Stress: Not on file   Social Connections: Not on file   Intimate Partner Violence: Not on file   Housing Stability: Not on file     Current Outpatient Medications   Medication Sig Dispense Refill    omeprazole (PRILOSEC) 40 MG delayed release capsule Take 1 capsule by mouth daily 90 capsule 1    hydroCHLOROthiazide (HYDRODIURIL) 25 MG tablet Take 1 tablet by mouth daily 90 tablet 1    atorvastatin (LIPITOR) 20 MG tablet Take 1 tablet by mouth daily 90 tablet 1    lisinopril-hydroCHLOROthiazide (PRINZIDE;ZESTORETIC) 10-12.5 MG per tablet Take 1 tablet by mouth daily 90 tablet 3    albuterol sulfate HFA (PROVENTIL;VENTOLIN;PROAIR) 108 (90 Base) MCG/ACT inhaler Inhale 2 puffs into the lungs every 6 hours as needed for Shortness of Breath 18 g 5    levothyroxine (SYNTHROID) 50 MCG tablet Take 1 tablet by mouth daily 90 tablet 1    zolpidem (AMBIEN) 10 MG tablet Take 1 tablet by mouth nightly as needed for Sleep for up to 30 days. 30 tablet 2    fluticasone-salmeterol (ADVAIR DISKUS) 250-50 MCG/ACT AEPB diskus inhaler Inhale 1 puff into the lungs in the morning and 1 puff in the evening. 60 each 3    Icosapent Ethyl (VASCEPA) 1 g CAPS capsule Take 1 capsule by mouth 2 times daily (with meals) 180 capsule 1    aspirin 81 MG EC tablet Take 81 mg by mouth daily      HYDROcodone-acetaminophen (NORCO) 7.5-325 MG per tablet Take 1 tablet by mouth 3 times daily. pregabalin (LYRICA) 150 MG capsule Take 150 mg by mouth 3 times daily. No current facility-administered medications for this visit. REVIEW OF SYSTEMS:  Review of systems is as indicated in HPI otherwise negative. PHYSICAL EXAM:  Vital Signs - /85   Pulse 85   Temp 98 °F (36.7 °C) (Temporal)   Ht 5' 2\" (1.575 m)   Wt 204 lb (92.5 kg)   LMP  (LMP Unknown)   SpO2 96%   BMI 37.31 kg/m²      Physical Exam  Vitals and nursing note reviewed. Constitutional:       General: She is in acute distress. Appearance: Normal appearance. She is obese. Comments: See HPI, pt is concerned about chronic bronchitis, developing copd, referral is given to pulmonology. HENT:      Head: Normocephalic and atraumatic. Nose: Congestion present. Mouth/Throat:      Mouth: Mucous membranes are moist.      Pharynx: Posterior oropharyngeal erythema present. Eyes:      Extraocular Movements: Extraocular movements intact. Conjunctiva/sclera: Conjunctivae normal.      Pupils: Pupils are equal, round, and reactive to light. Cardiovascular:      Rate and Rhythm: Normal rate and regular rhythm. Pulses: Normal pulses. Heart sounds: Normal heart sounds. Pulmonary:      Effort: Pulmonary effort is normal.      Breath sounds: Rhonchi present. Comments: Coarse breath sounds, with increased expiratory phase, congestion lower lung fields, consistent with tobacco abuse, see HPI  Abdominal:      General: Bowel sounds are normal.      Palpations: Abdomen is soft. Comments: Obese, BMI 37.31, weight 204lbs   Musculoskeletal:         General: Normal range of motion. Cervical back: Normal range of motion and neck supple. Skin:     General: Skin is warm and dry. Capillary Refill: Capillary refill takes 2 to 3 seconds. Neurological:      General: No focal deficit present. Mental Status: She is alert and oriented to person, place, and time. Psychiatric:         Behavior: Behavior normal.         Thought Content: Thought content normal.         Judgment: Judgment normal.      Comments: See HPI, pt is acutely concerned about her health. LABS  No results found for this visit on 12/05/22. IMPRESSION/PLAN     Diagnosis Orders   1. Mucopurulent chronic bronchitis (HCC)  albuterol sulfate HFA (PROVENTIL;VENTOLIN;PROAIR) 108 (90 Base) MCG/ACT inhaler    fluticasone-salmeterol (ADVAIR DISKUS) 250-50 MCG/ACT AEPB diskus inhaler    [facility]      2. Gastroesophageal reflux disease without esophagitis  omeprazole (PRILOSEC) 40 MG delayed release capsule      3. Hypothyroidism due to Hashimoto's thyroiditis  levothyroxine (SYNTHROID) 50 MCG tablet      4.  Mixed hyperlipidemia  atorvastatin (LIPITOR) 20 MG tablet      5. Elevated triglycerides with high cholesterol  atorvastatin (LIPITOR) 20 MG tablet      6. Essential hypertension  hydroCHLOROthiazide (HYDRODIURIL) 25 MG tablet    lisinopril-hydroCHLOROthiazide (PRINZIDE;ZESTORETIC) 10-12.5 MG per tablet      7. Screening mammogram for breast cancer  FILIPE DIGITAL SCREEN W OR WO CAD BILATERAL      8. Primary insomnia  zolpidem (AMBIEN) 10 MG tablet      9. Tobacco abuse disorder  albuterol sulfate HFA (PROVENTIL;VENTOLIN;PROAIR) 108 (90 Base) MCG/ACT inhaler    fluticasone-salmeterol (ADVAIR DISKUS) 250-50 MCG/ACT AEPB diskus inhaler    [facility] [place]      10. Primary hypertension            No follow-up provider specified. Robbie Card MD            Dictated using voice recognition software.  Proofread, but unrecognized voice recognition errors may exist.

## 2022-12-09 PROBLEM — Z12.31 SCREENING MAMMOGRAM FOR BREAST CANCER: Status: ACTIVE | Noted: 2022-12-09

## 2022-12-09 PROBLEM — Z72.0 TOBACCO ABUSE DISORDER: Status: ACTIVE | Noted: 2022-12-09

## 2022-12-27 ENCOUNTER — HOSPITAL ENCOUNTER (OUTPATIENT)
Dept: MAMMOGRAPHY | Age: 52
Discharge: HOME | End: 2022-12-30
Payer: MEDICARE

## 2022-12-27 DIAGNOSIS — Z12.31 SCREENING MAMMOGRAM FOR BREAST CANCER: ICD-10-CM

## 2022-12-27 PROCEDURE — 77067 SCR MAMMO BI INCL CAD: CPT

## 2023-01-03 DIAGNOSIS — J41.1 MUCOPURULENT CHRONIC BRONCHITIS (HCC): Primary | ICD-10-CM

## 2023-01-06 ENCOUNTER — HOSPITAL ENCOUNTER (OUTPATIENT)
Dept: GENERAL RADIOLOGY | Age: 53
Discharge: HOME | End: 2023-01-06
Payer: MEDICARE

## 2023-01-06 ENCOUNTER — OFFICE VISIT (OUTPATIENT)
Dept: PULMONOLOGY | Age: 53
End: 2023-01-06
Payer: MEDICARE

## 2023-01-06 VITALS
BODY MASS INDEX: 37.73 KG/M2 | SYSTOLIC BLOOD PRESSURE: 124 MMHG | WEIGHT: 205 LBS | TEMPERATURE: 98.1 F | HEIGHT: 62 IN | OXYGEN SATURATION: 96 % | RESPIRATION RATE: 14 BRPM | DIASTOLIC BLOOD PRESSURE: 85 MMHG | HEART RATE: 96 BPM

## 2023-01-06 DIAGNOSIS — J41.1 MUCOPURULENT CHRONIC BRONCHITIS (HCC): ICD-10-CM

## 2023-01-06 DIAGNOSIS — E66.01 SEVERE OBESITY (BMI 35.0-39.9) WITH COMORBIDITY (HCC): ICD-10-CM

## 2023-01-06 DIAGNOSIS — F17.210 NICOTINE DEPENDENCE, CIGARETTES, UNCOMPLICATED: ICD-10-CM

## 2023-01-06 DIAGNOSIS — R29.818 SUSPECTED SLEEP APNEA: ICD-10-CM

## 2023-01-06 DIAGNOSIS — J42 CHRONIC BRONCHITIS, UNSPECIFIED CHRONIC BRONCHITIS TYPE (HCC): Primary | ICD-10-CM

## 2023-01-06 LAB
EXPIRATORY TIME: NORMAL
FEF 25-75% %PRED-PRE: NORMAL
FEF 25-75% PRED: NORMAL
FEF 25-75%-PRE: NORMAL
FEV1 %PRED-PRE: 72 %
FEV1 PRED: NORMAL
FEV1/FVC %PRED-PRE: NORMAL
FEV1/FVC PRED: NORMAL
FEV1/FVC: 68 %
FEV1: 1.89 L
FVC %PRED-PRE: 84 %
FVC PRED: NORMAL
FVC: 2.78 L
PEF %PRED-PRE: NORMAL
PEF PRED: NORMAL
PEF-PRE: NORMAL

## 2023-01-06 PROCEDURE — 3023F SPIROM DOC REV: CPT | Performed by: INTERNAL MEDICINE

## 2023-01-06 PROCEDURE — 71046 X-RAY EXAM CHEST 2 VIEWS: CPT

## 2023-01-06 PROCEDURE — G8484 FLU IMMUNIZE NO ADMIN: HCPCS | Performed by: INTERNAL MEDICINE

## 2023-01-06 PROCEDURE — 3079F DIAST BP 80-89 MM HG: CPT | Performed by: INTERNAL MEDICINE

## 2023-01-06 PROCEDURE — 4004F PT TOBACCO SCREEN RCVD TLK: CPT | Performed by: INTERNAL MEDICINE

## 2023-01-06 PROCEDURE — G8417 CALC BMI ABV UP PARAM F/U: HCPCS | Performed by: INTERNAL MEDICINE

## 2023-01-06 PROCEDURE — 94010 BREATHING CAPACITY TEST: CPT | Performed by: INTERNAL MEDICINE

## 2023-01-06 PROCEDURE — 3017F COLORECTAL CA SCREEN DOC REV: CPT | Performed by: INTERNAL MEDICINE

## 2023-01-06 PROCEDURE — G8427 DOCREV CUR MEDS BY ELIG CLIN: HCPCS | Performed by: INTERNAL MEDICINE

## 2023-01-06 PROCEDURE — 99204 OFFICE O/P NEW MOD 45 MIN: CPT | Performed by: INTERNAL MEDICINE

## 2023-01-06 PROCEDURE — 3074F SYST BP LT 130 MM HG: CPT | Performed by: INTERNAL MEDICINE

## 2023-01-06 ASSESSMENT — PULMONARY FUNCTION TESTS
FEV1/FVC: 68
FVC: 2.78
FVC_PERCENT_PREDICTED_PRE: 84
FEV1: 1.89
FEV1_PERCENT_PREDICTED_PRE: 72

## 2023-01-06 NOTE — PROGRESS NOTES
[facility]: Patient Office Visit Note  [address] Eileen Nelson. [address] [place] [address], [address] [place] [address]  [phone]    Patient Name:  Nakia Morales  YOB: 1970            Date of Service:  1/6/2023    Chief Complaint   Patient presents with    New Patient       History of Present Illness: This is a 53-year-old white female with a history of fibromyalgia, chronic back pain, COPD, tobacco use that we are seeing in consultation for Dr. Kyrie Barrios regarding COPD. The patient states she has been smoking 35 years proxy 1 pack of cigarettes per day. Over the past couple years she developed cough productive of mucus which is mostly clear. Cough tends to be worse in the AM.  Mucus is mostly white. In addition to the above she gets short of breath when walking fast or climbing stairs. She can go up a flight of stairs before having to stop. There has been some wheezing. But no chest pain. Patient notes that she does snore. She tends to say tired and fatigued. There is questionable daytime hypersomnolence. She has a difficult time sleeping. She has never been tested for sleep apnea.     Past Medical History:   Diagnosis Date    Arthritis     osteo    Chronic back pain     Cigarette smoker     COPD (chronic obstructive pulmonary disease) ([facility])     prn inhaler-    Esophageal reflux     prn med    Fibromyalgia     High triglycerides     managed well with meds    Insomnia     Mixed hyperlipidemia 08/06/2014    Right elbow pain     Sleep apnea 01/26/2021       Patient Active Problem List   Diagnosis    Lateral epicondylitis, right elbow    Edema    Encounter for smoking cessation counseling    Hyperlipemia    Mixed hyperlipidemia    Severe obesity ([facility] [address]    Umbilical hernia without obstruction and without gangrene    Insomnia, unspecified    S/P umbilical hernia repair, follow-up exam    Recurrent umbilical hernia    Fibromyalgia    Rash and nonspecific skin eruption    Essential hypertension    Gastroesophageal reflux disease without esophagitis    Elevated fasting glucose    Screening for cardiovascular condition    Weight loss counseling, encounter for    Elevated triglycerides with high cholesterol    Family history of coronary arteriosclerosis    Chronic obstructive pulmonary disease ([facility] [address]    Hypothyroidism due to Hashimoto's thyroiditis    Tobacco abuse disorder    Screening mammogram for breast cancer       Past Surgical History:   Procedure Laterality Date    APPENDECTOMY       SECTION      X2    COLONOSCOPY      HERNIA REPAIR  10/4697    umbilical    HYSTERECTOMY (CERVIX STATUS UNKNOWN)      AGE 30? Social History     Socioeconomic History    Marital status:      Spouse name: Not on file    Number of children: Not on file    Years of education: Not on file    Highest education level: Not on file   Occupational History    Not on file   Tobacco Use    Smoking status: Every Day     Packs/day: 1.00     Years: 35.00     Pack years: 35.00     Types: Cigarettes    Smokeless tobacco: Never   Substance and Sexual Activity    Alcohol use: No    Drug use: No    Sexual activity: Not on file   Other Topics Concern    Not on file   Social History Narrative    Not on file     Social Determinants of Health     Financial Resource Strain: Not on file   Food Insecurity: Not on file   Transportation Needs: Not on file   Physical Activity: Not on file   Stress: Not on file   Social Connections: Not on file   Intimate Partner Violence: Not on file   Housing Stability: Not on file       Family History   Problem Relation Age of Onset    Heart Attack Father     Cancer Mother         Lung CA    Diabetes Father     Diabetes Mother        No Known Allergies        REVIEW OF SYSTEMS:    CONSTITUTIONAL:   There is no history of fever, chills, night sweats, weight loss, weight gain,   persistent fatigue, or lethargy/hypersomnolence.      EYES:   Denies problems with eye pain, erythema, blurred vision, or visual field loss. ENTM:   Denies history of tinnitus, epistaxis, sore throat, hoarseness, or dysphonia. LYMPH:   Denies swollen glands. CARDIAC:   No chest pain, pressure, discomfort, palpitations, orthopnea, murmurs, or edema. GI:   No dysphagia, heartburn reflux, nausea/vomiting, diarrhea, abdominal pain, or bleeding. :   Denies history of dysuria, hematuria, polyuria, or decreased urine output. MS:   No history of myalgias, arthralgias, bone pain, or muscle cramps. SKIN:   No history of rashes, jaundice, cyanosis, nodules, or ulcers. ENDO:   Negative for heat or cold intolerance. No history of DM. PSYCH:   Negative for anxiety, depression, insomnia, hallucinations. NEURO:   There is no history of AMS, persistent headache, decreased level of consciousness, seizures, or motor or sensory deficits. OBJECTIVE:  Physical Exam:  Vitals:    01/06/23 0948   BP: 124/85   Pulse: 96   Resp: 14   Temp: 98.1 °F (36.7 °C)   SpO2: 96%        GENERAL APPEARANCE:   The patient is obeseand in no respiratory distress. HEENT:   PERRL. Conjunctivae unremarkable. Nasal mucosa is without epistaxis, exudate, or polyps. Gums and dentition are unremarkable. There is no oropharyngeal narrowing. TMs are clear. NECK/LYMPHATIC:   Symmetrical with no elevation of jugular venous pulsation. Trachea midline. No thyroid enlargement. No cervical adenopathy. LUNGS:   Normal respiratory effort with symmetrical lung expansion. Breath sounds clear. HEART:   There is a regular rate and rhythm. No murmur, rub, or gallop. There is no edema in the lower extremities. ABDOMEN:   Soft and non-tender. No hepatosplenomegaly. Bowel sounds are normal.       SKIN:   There are no rashes, cyanosis, jaundice, or ecchymosis present.      EXTREMITIES:   The extremities are unremarkable without clubbing, cyanosis, joint inflammation, degenerative, or ischemic change. MUSCULOSKELETAL:   There is no abnormal tone, muscle atrophy, or abnormal movement present. NEURO:   The patient is alert and oriented to person, place, and time. Memory appears intact and mood is normal.  No gross sensorimotor deficits are present. Current Outpatient Medications   Medication Instructions    albuterol sulfate HFA (PROVENTIL;VENTOLIN;PROAIR) 108 (90 Base) MCG/ACT inhaler 2 puffs, Inhalation, EVERY 6 HOURS PRN    aspirin 81 mg, Oral, DAILY    atorvastatin (LIPITOR) 20 mg, Oral, DAILY    fluticasone-salmeterol (ADVAIR DISKUS) 250-50 MCG/ACT AEPB diskus inhaler 1 puff, Inhalation, EVERY 12 HOURS    hydroCHLOROthiazide (HYDRODIURIL) 25 mg, Oral, DAILY    HYDROcodone-acetaminophen (NORCO) 7.5-325 MG per tablet 1 tablet, Oral, 3 TIMES DAILY    Icosapent Ethyl (VASCEPA) 1 g CAPS capsule 1 capsule, Oral, 2 TIMES DAILY WITH MEALS    levothyroxine (SYNTHROID) 50 mcg, Oral, DAILY    lisinopril-hydroCHLOROthiazide (PRINZIDE;ZESTORETIC) 10-12.5 MG per tablet 1 tablet, Oral, DAILY    omeprazole (PRILOSEC) 40 mg, Oral, DAILY    pregabalin (LYRICA) 150 mg, Oral, 3 TIMES DAILY    tiotropium (SPIRIVA RESPIMAT) 2.5 MCG/ACT AERS inhaler 2 puffs, Inhalation, DAILY        DIAGNOSTIC TESTS:   CT of chest:     CXR: Some increased interstitial markings         Spirometry: 01/06/2023 mild obstruction      Office Spirometry Results Latest Ref Rng & Units 1/6/2023   FVC L 2.78   FEV1 L 1.89   FEV1 %PRED-PRE % 72   FVC %PRED-PRE % 84   FEV1/FVC % 68        Exercise oximetry:      PCXR: No valid procedures specified. CXR PA and lateral:  No results found for this or any previous visit. PET/CT: No valid procedures specified. CT of chest w contrast:  No valid procedures specified. Screening chest CT: No results found for this or any previous visit. CT of chest w/out contrast:   No results found for this or any previous visit. ASSESSMENT:   Diagnosis Orders   1.  Chronic bronchitis, unspecified chronic bronchitis type ([facility] [address]  Spirometry Without Bronchodilator    Spirometry Without Bronchodilator      2. Suspected sleep apnea        3. Severe obesity (BMI 35.0-39. 9) with comorbidity ([facility]        4. Nicotine dependence, cigarettes, uncomplicated            PLAN:  Compete pfts on return  Total smoking cessation is advised. Patient's tobacco use confirmed as documented in the medical record. Discussed various smoking cessation products including pills, patches, inhaler, gum, weaning self off, \"cold turkey\", and smoking cessation classes. Discussed also with patient disease risk of ongoing smoking including CVA, lung cancer, and heart disease. At this point, patient's commitment to quitting is fair. 7 minutes were spent counseling patient regarding tobacco cessation.    Continue albuterol and air duo for now  Sample of Spiriva given 2 puffs q day  Follow-up in 3 months  psg    Orders Placed This Encounter   Procedures    Spirometry Without Bronchodilator     Standing Status:   Future     Number of Occurrences:   1     Standing Expiration Date:   1/6/2024       Orders Placed This Encounter   Medications    tiotropium (SPIRIVA RESPIMAT) 2.5 MCG/ACT AERS inhaler     Sig: Inhale 2 puffs into the lungs daily     Dispense:  1 each     Refill:  11           Electronically signed by  Naye Rosario MD

## 2023-02-21 RX ORDER — LISINOPRIL AND HYDROCHLOROTHIAZIDE 12.5; 1 MG/1; MG/1
TABLET ORAL
Qty: 90 TABLET | Refills: 1 | OUTPATIENT
Start: 2023-02-21

## 2023-02-27 DIAGNOSIS — R79.9 ABNORMAL BLOOD CHEMISTRY: ICD-10-CM

## 2023-02-27 DIAGNOSIS — R53.82 CHRONIC FATIGUE: ICD-10-CM

## 2023-02-27 DIAGNOSIS — Z13.6 SCREENING FOR ISCHEMIC HEART DISEASE: Primary | ICD-10-CM

## 2023-02-27 DIAGNOSIS — Z13.228 SCREENING FOR PHENYLKETONURIA (PKU): ICD-10-CM

## 2023-02-27 DIAGNOSIS — Z13.6 SCREENING FOR ISCHEMIC HEART DISEASE: ICD-10-CM

## 2023-02-27 LAB
ALBUMIN SERPL-MCNC: 3.8 G/DL (ref 3.5–5)
ALBUMIN/GLOB SERPL: 1.2 (ref 0.4–1.6)
ALP SERPL-CCNC: 98 U/L (ref 50–136)
ALT SERPL-CCNC: 24 U/L (ref 12–65)
ANION GAP SERPL CALC-SCNC: 5 MMOL/L (ref 2–11)
AST SERPL-CCNC: 16 U/L (ref 15–37)
BASOPHILS # BLD: 0.1 K/UL (ref 0–0.2)
BASOPHILS NFR BLD: 1 % (ref 0–2)
BILIRUB SERPL-MCNC: 0.5 MG/DL (ref 0.2–1.1)
BUN SERPL-MCNC: 13 MG/DL (ref 6–23)
CALCIUM SERPL-MCNC: 9.4 MG/DL (ref 8.3–10.4)
CHLORIDE SERPL-SCNC: 107 MMOL/L (ref 101–110)
CHOLEST SERPL-MCNC: 187 MG/DL
CO2 SERPL-SCNC: 28 MMOL/L (ref 21–32)
CREAT SERPL-MCNC: 0.7 MG/DL (ref 0.6–1)
DIFFERENTIAL METHOD BLD: ABNORMAL
EOSINOPHIL # BLD: 0 K/UL (ref 0–0.8)
EOSINOPHIL NFR BLD: 0 % (ref 0.5–7.8)
ERYTHROCYTE [DISTWIDTH] IN BLOOD BY AUTOMATED COUNT: 13.7 % (ref 11.9–14.6)
GLOBULIN SER CALC-MCNC: 3.3 G/DL (ref 2.8–4.5)
GLUCOSE SERPL-MCNC: 159 MG/DL (ref 65–100)
HCT VFR BLD AUTO: 47.9 % (ref 35.8–46.3)
HDLC SERPL-MCNC: 36 MG/DL (ref 40–60)
HDLC SERPL: 5.2
HGB BLD-MCNC: 15 G/DL (ref 11.7–15.4)
IMM GRANULOCYTES # BLD AUTO: 0 K/UL (ref 0–0.5)
IMM GRANULOCYTES NFR BLD AUTO: 0 % (ref 0–5)
LDLC SERPL CALC-MCNC: ABNORMAL MG/DL
LDLC SERPL DIRECT ASSAY-MCNC: 97 MG/DL
LYMPHOCYTES # BLD: 1.9 K/UL (ref 0.5–4.6)
LYMPHOCYTES NFR BLD: 20 % (ref 13–44)
MCH RBC QN AUTO: 29.9 PG (ref 26.1–32.9)
MCHC RBC AUTO-ENTMCNC: 31.3 G/DL (ref 31.4–35)
MCV RBC AUTO: 95.6 FL (ref 82–102)
MONOCYTES # BLD: 0.4 K/UL (ref 0.1–1.3)
MONOCYTES NFR BLD: 5 % (ref 4–12)
NEUTS SEG # BLD: 6.8 K/UL (ref 1.7–8.2)
NEUTS SEG NFR BLD: 74 % (ref 43–78)
NRBC # BLD: 0 K/UL (ref 0–0.2)
PLATELET # BLD AUTO: 188 K/UL (ref 150–450)
PMV BLD AUTO: 11.1 FL (ref 9.4–12.3)
POTASSIUM SERPL-SCNC: 4.2 MMOL/L (ref 3.5–5.1)
PROT SERPL-MCNC: 7.1 G/DL (ref 6.3–8.2)
RBC # BLD AUTO: 5.01 M/UL (ref 4.05–5.2)
SODIUM SERPL-SCNC: 140 MMOL/L (ref 133–143)
T4 FREE SERPL-MCNC: 0.9 NG/DL (ref 0.78–1.46)
TRIGL SERPL-MCNC: 419 MG/DL (ref 35–150)
TSH, 3RD GENERATION: 2.28 UIU/ML (ref 0.36–3.74)
VLDLC SERPL CALC-MCNC: 83.8 MG/DL (ref 6–23)
WBC # BLD AUTO: 9.3 K/UL (ref 4.3–11.1)

## 2023-03-06 ENCOUNTER — OFFICE VISIT (OUTPATIENT)
Dept: PRIMARY CARE CLINIC | Facility: CLINIC | Age: 53
End: 2023-03-06
Payer: MEDICARE

## 2023-03-06 DIAGNOSIS — J06.9 UPPER RESPIRATORY TRACT INFECTION, UNSPECIFIED TYPE: ICD-10-CM

## 2023-03-06 DIAGNOSIS — Z72.0 TOBACCO ABUSE DISORDER: ICD-10-CM

## 2023-03-06 DIAGNOSIS — J41.1 MUCOPURULENT CHRONIC BRONCHITIS (HCC): ICD-10-CM

## 2023-03-06 DIAGNOSIS — E66.01 SEVERE OBESITY (HCC): ICD-10-CM

## 2023-03-06 DIAGNOSIS — E06.3 HYPOTHYROIDISM DUE TO HASHIMOTO'S THYROIDITIS: ICD-10-CM

## 2023-03-06 DIAGNOSIS — F51.01 PRIMARY INSOMNIA: ICD-10-CM

## 2023-03-06 DIAGNOSIS — E78.2 MIXED HYPERLIPIDEMIA: Primary | ICD-10-CM

## 2023-03-06 DIAGNOSIS — E03.8 HYPOTHYROIDISM DUE TO HASHIMOTO'S THYROIDITIS: ICD-10-CM

## 2023-03-06 PROCEDURE — 3023F SPIROM DOC REV: CPT | Performed by: FAMILY MEDICINE

## 2023-03-06 PROCEDURE — G8417 CALC BMI ABV UP PARAM F/U: HCPCS | Performed by: FAMILY MEDICINE

## 2023-03-06 PROCEDURE — 4004F PT TOBACCO SCREEN RCVD TLK: CPT | Performed by: FAMILY MEDICINE

## 2023-03-06 PROCEDURE — G8427 DOCREV CUR MEDS BY ELIG CLIN: HCPCS | Performed by: FAMILY MEDICINE

## 2023-03-06 PROCEDURE — 3017F COLORECTAL CA SCREEN DOC REV: CPT | Performed by: FAMILY MEDICINE

## 2023-03-06 PROCEDURE — 99214 OFFICE O/P EST MOD 30 MIN: CPT | Performed by: FAMILY MEDICINE

## 2023-03-06 PROCEDURE — G8484 FLU IMMUNIZE NO ADMIN: HCPCS | Performed by: FAMILY MEDICINE

## 2023-03-06 RX ORDER — ZOLPIDEM TARTRATE 10 MG/1
TABLET ORAL NIGHTLY PRN
COMMUNITY
End: 2023-03-06 | Stop reason: SDUPTHER

## 2023-03-06 RX ORDER — ATORVASTATIN CALCIUM 40 MG/1
40 TABLET, FILM COATED ORAL DAILY
Qty: 90 TABLET | Refills: 3 | Status: SHIPPED | OUTPATIENT
Start: 2023-03-06

## 2023-03-06 RX ORDER — AZITHROMYCIN 250 MG/1
250 TABLET, FILM COATED ORAL SEE ADMIN INSTRUCTIONS
Qty: 6 TABLET | Refills: 0 | Status: SHIPPED | OUTPATIENT
Start: 2023-03-06 | End: 2023-03-11

## 2023-03-06 RX ORDER — ZOLPIDEM TARTRATE 10 MG/1
10 TABLET ORAL NIGHTLY PRN
Qty: 30 TABLET | Refills: 2 | Status: SHIPPED | OUTPATIENT
Start: 2023-03-06 | End: 2023-04-05

## 2023-03-06 SDOH — ECONOMIC STABILITY: HOUSING INSECURITY
IN THE LAST 12 MONTHS, WAS THERE A TIME WHEN YOU DID NOT HAVE A STEADY PLACE TO SLEEP OR SLEPT IN A SHELTER (INCLUDING NOW)?: NO

## 2023-03-06 SDOH — ECONOMIC STABILITY: FOOD INSECURITY: WITHIN THE PAST 12 MONTHS, THE FOOD YOU BOUGHT JUST DIDN'T LAST AND YOU DIDN'T HAVE MONEY TO GET MORE.: NEVER TRUE

## 2023-03-06 SDOH — ECONOMIC STABILITY: FOOD INSECURITY: WITHIN THE PAST 12 MONTHS, YOU WORRIED THAT YOUR FOOD WOULD RUN OUT BEFORE YOU GOT MONEY TO BUY MORE.: NEVER TRUE

## 2023-03-06 SDOH — ECONOMIC STABILITY: INCOME INSECURITY: HOW HARD IS IT FOR YOU TO PAY FOR THE VERY BASICS LIKE FOOD, HOUSING, MEDICAL CARE, AND HEATING?: NOT VERY HARD

## 2023-03-06 ASSESSMENT — PATIENT HEALTH QUESTIONNAIRE - PHQ9
SUM OF ALL RESPONSES TO PHQ QUESTIONS 1-9: 0
2. FEELING DOWN, DEPRESSED OR HOPELESS: 0
1. LITTLE INTEREST OR PLEASURE IN DOING THINGS: 0
SUM OF ALL RESPONSES TO PHQ9 QUESTIONS 1 & 2: 0

## 2023-03-06 NOTE — PROGRESS NOTES
Lula Hong MD  [address] [place], [place] [address]  Ph No:  [phone]  Fax:  [phone]:  Chief Complaint   Patient presents with    Follow-up     Pt in for 3 month follow up. Pt has no issues today. HISTORY OF PRESENT ILLNESS:  Ms. Della Vidal is a 50 y.o. female. Pt presents for 3 months follow up. Pt reports no specific concerns. Pt presents for recheck or labs and medications. Labs are reviewed with pt as follows:  Gfr>60, excellent kidney health. Fasting glucose 159, will need hgba1c, next visit, currently non diabetic. Total cholesterol 187, hdl 36, but triglycerides 419 (on meds). Ratio or cardiac risk 5.2, high normal.  Liver enzymes normal, alt 24, ast 16  Thyroid normal, tsh 2.280, free t4 0.9  Hgb 15.0, mcv 95.6    Pt is given increase of lipitor for triglyceride management to 40mg dose, recheck in 3-6 months. The following medications are given for refills:  Ambien for sleep/insomnia  Lipitor, increased to 40mg for cholesterol management. Zithromax for sinus, lung clearance, congestion. Other meds already given:  Spirva for lung (copd maintenance)  Omeprazole for acid reflux  Hctz for fluid management  Lisinopril-hctz for BP management, /85  Albuterol and advair for copd,   Synthroid for hypothyroidism. Pt reports she is considering a sleep study, advised to have someone in home listen to her while she is sleeping to see if she has pauses of breathing. Pt is advised she may have sleep apnea, which can be treated with a cpap, but will need testing to determine. Pt will let physician know is she wants to have cpap testing.     Pt will RTC in 3 months, recheck, labs cmp, lipid, hgba1c, vit d    HISTORY:  No Known Allergies  Past Medical History:   Diagnosis Date    Arthritis     osteo    Chronic back pain     Cigarette smoker     COPD (chronic obstructive pulmonary disease) (HCC)     prn inhaler-    Esophageal reflux prn med    Fibromyalgia     High triglycerides     managed well with meds    Insomnia     Mixed hyperlipidemia 2014    Right elbow pain     Sleep apnea 2021     Past Surgical History:   Procedure Laterality Date    APPENDECTOMY       SECTION      X2    COLONOSCOPY      HERNIA REPAIR      umbilical    HYSTERECTOMY (CERVIX STATUS UNKNOWN)      AGE 30? Family History   Problem Relation Age of Onset    Heart Attack Father     Cancer Mother         Lung CA    Diabetes Father     Diabetes Mother      Social History     Socioeconomic History    Marital status:      Spouse name: Not on file    Number of children: Not on file    Years of education: Not on file    Highest education level: Not on file   Occupational History    Not on file   Tobacco Use    Smoking status: Every Day     Packs/day: 1.00     Years: 35.00     Pack years: 35.00     Types: Cigarettes    Smokeless tobacco: Never   Substance and Sexual Activity    Alcohol use: No    Drug use: No    Sexual activity: Not on file   Other Topics Concern    Not on file   Social History Narrative    Not on file     Social Determinants of Health     Financial Resource Strain: Low Risk     Difficulty of Paying Living Expenses: Not very hard   Food Insecurity: No Food Insecurity    Worried About Running Out of Food in the Last Year: Never true    Ran Out of Food in the Last Year: Never true   Transportation Needs: Unknown    Lack of Transportation (Medical): Not on file    Lack of Transportation (Non-Medical):  No   Physical Activity: Not on file   Stress: Not on file   Social Connections: Not on file   Intimate Partner Violence: Not on file   Housing Stability: Unknown    Unable to Pay for Housing in the Last Year: Not on file    Number of Places Lived in the Last Year: Not on file    Unstable Housing in the Last Year: No     Current Outpatient Medications   Medication Sig Dispense Refill    zolpidem (AMBIEN) 10 MG tablet Take 1 tablet by mouth nightly as needed for Sleep for up to 30 days. Max Daily Amount: 10 mg 30 tablet 2    atorvastatin (LIPITOR) 40 MG tablet Take 1 tablet by mouth daily (Increase of dose for high triglycerides) 90 tablet 3    azithromycin (ZITHROMAX) 250 MG tablet Take 1 tablet by mouth See Admin Instructions for 5 days 500mg on day 1 followed by 250mg on days 2 - 5 6 tablet 0    tiotropium (SPIRIVA RESPIMAT) 2.5 MCG/ACT AERS inhaler Inhale 2 puffs into the lungs daily 1 each 11    omeprazole (PRILOSEC) 40 MG delayed release capsule Take 1 capsule by mouth daily 90 capsule 1    hydroCHLOROthiazide (HYDRODIURIL) 25 MG tablet Take 1 tablet by mouth daily 90 tablet 1    lisinopril-hydroCHLOROthiazide (PRINZIDE;ZESTORETIC) 10-12.5 MG per tablet Take 1 tablet by mouth daily 90 tablet 3    albuterol sulfate HFA (PROVENTIL;VENTOLIN;PROAIR) 108 (90 Base) MCG/ACT inhaler Inhale 2 puffs into the lungs every 6 hours as needed for Shortness of Breath 18 g 5    levothyroxine (SYNTHROID) 50 MCG tablet Take 1 tablet by mouth daily 90 tablet 1    fluticasone-salmeterol (ADVAIR DISKUS) 250-50 MCG/ACT AEPB diskus inhaler Inhale 1 puff into the lungs in the morning and 1 puff in the evening. 60 each 3    Icosapent Ethyl (VASCEPA) 1 g CAPS capsule Take 1 capsule by mouth 2 times daily (with meals) 180 capsule 1    aspirin 81 MG EC tablet Take 81 mg by mouth daily      HYDROcodone-acetaminophen (NORCO) 7.5-325 MG per tablet Take 1 tablet by mouth 3 times daily. pregabalin (LYRICA) 150 MG capsule Take 150 mg by mouth 3 times daily. No current facility-administered medications for this visit. REVIEW OF SYSTEMS:  Review of systems is as indicated in HPI otherwise negative. PHYSICAL EXAM:  Vital Signs - There were no vitals taken for this visit. Physical Exam  Vitals and nursing note reviewed. Constitutional:       General: She is in acute distress. Appearance: Normal appearance. She is obese.       Comments: See HPI ,med refills, labs review, ? Of sleep apnea   HENT:      Head: Normocephalic and atraumatic. Ears:      Comments: Bilateral TM swelling, R.L, indicated throat and upper respiratory congestion. Nose: Congestion present. Mouth/Throat:      Mouth: Mucous membranes are moist.      Pharynx: Posterior oropharyngeal erythema present. Eyes:      Extraocular Movements: Extraocular movements intact. Conjunctiva/sclera: Conjunctivae normal.      Pupils: Pupils are equal, round, and reactive to light. Cardiovascular:      Rate and Rhythm: Normal rate and regular rhythm. Pulses: Normal pulses. Heart sounds: Normal heart sounds. Pulmonary:      Effort: Pulmonary effort is normal.      Breath sounds: Rhonchi present. Comments: Lower lung fields moderately congested heard best when coughing, phlegm buildup lower lungs, consistent with moderate to severe copd. Abdominal:      General: Bowel sounds are normal.      Palpations: Abdomen is soft. Comments: Obese, BMI 37.49, weight 205lbs   Musculoskeletal:         General: Normal range of motion. Cervical back: Normal range of motion and neck supple. Skin:     General: Skin is warm and dry. Capillary Refill: Capillary refill takes 2 to 3 seconds. Neurological:      General: No focal deficit present. Mental Status: She is alert and oriented to person, place, and time. Psychiatric:         Behavior: Behavior normal.         Thought Content: Thought content normal.         Judgment: Judgment normal.      Comments: General health concerns, meds refills. LABS  No results found for this visit on 03/06/23. IMPRESSION/PLAN     Diagnosis Orders   1. Mixed hyperlipidemia  atorvastatin (LIPITOR) 40 MG tablet      2. Primary insomnia  zolpidem (AMBIEN) 10 MG tablet      3. Upper respiratory tract infection, unspecified type  azithromycin (ZITHROMAX) 250 MG tablet      4.  Mucopurulent chronic bronchitis ([facility] [address] azithromycin (ZITHROMAX) 250 MG tablet      5. Hypothyroidism due to Hashimoto's thyroiditis        6. Tobacco abuse disorder        7. Severe obesity ([address]            No follow-up provider specified. Derek Trotter MD            Dictated using voice recognition software.  Proofread, but unrecognized voice recognition errors may exist.

## 2023-03-07 PROBLEM — J06.9 UPPER RESPIRATORY TRACT INFECTION: Status: ACTIVE | Noted: 2023-03-07

## 2023-03-14 ENCOUNTER — TELEPHONE (OUTPATIENT)
Dept: PRIMARY CARE CLINIC | Facility: CLINIC | Age: 53
End: 2023-03-14

## 2023-03-14 DIAGNOSIS — J01.01 ACUTE RECURRENT MAXILLARY SINUSITIS: Primary | ICD-10-CM

## 2023-03-14 RX ORDER — LEVOFLOXACIN 500 MG/1
500 TABLET, FILM COATED ORAL DAILY
Qty: 10 TABLET | Refills: 0 | Status: SHIPPED | OUTPATIENT
Start: 2023-03-14 | End: 2023-03-24

## 2023-06-01 DIAGNOSIS — R53.82 CHRONIC FATIGUE: ICD-10-CM

## 2023-06-01 DIAGNOSIS — R79.9 ABNORMAL BLOOD CHEMISTRY: ICD-10-CM

## 2023-06-01 DIAGNOSIS — Z13.228 SCREENING FOR PHENYLKETONURIA (PKU): ICD-10-CM

## 2023-06-01 DIAGNOSIS — Z13.6 SCREENING FOR ISCHEMIC HEART DISEASE: ICD-10-CM

## 2023-06-01 DIAGNOSIS — Z13.6 SCREENING FOR ISCHEMIC HEART DISEASE: Primary | ICD-10-CM

## 2023-06-01 LAB
BASOPHILS # BLD: 0.1 K/UL (ref 0–0.2)
BASOPHILS NFR BLD: 1 % (ref 0–2)
DIFFERENTIAL METHOD BLD: ABNORMAL
EOSINOPHIL # BLD: 0 K/UL (ref 0–0.8)
EOSINOPHIL NFR BLD: 0 % (ref 0.5–7.8)
ERYTHROCYTE [DISTWIDTH] IN BLOOD BY AUTOMATED COUNT: 13.8 % (ref 11.9–14.6)
HCT VFR BLD AUTO: 46.1 % (ref 35.8–46.3)
HGB BLD-MCNC: 14.7 G/DL (ref 11.7–15.4)
IMM GRANULOCYTES # BLD AUTO: 0 K/UL (ref 0–0.5)
IMM GRANULOCYTES NFR BLD AUTO: 0 % (ref 0–5)
LYMPHOCYTES # BLD: 2.2 K/UL (ref 0.5–4.6)
LYMPHOCYTES NFR BLD: 24 % (ref 13–44)
MCH RBC QN AUTO: 30.6 PG (ref 26.1–32.9)
MCHC RBC AUTO-ENTMCNC: 31.9 G/DL (ref 31.4–35)
MCV RBC AUTO: 96 FL (ref 82–102)
MONOCYTES # BLD: 0.5 K/UL (ref 0.1–1.3)
MONOCYTES NFR BLD: 5 % (ref 4–12)
NEUTS SEG # BLD: 6.4 K/UL (ref 1.7–8.2)
NEUTS SEG NFR BLD: 70 % (ref 43–78)
NRBC # BLD: 0 K/UL (ref 0–0.2)
PLATELET # BLD AUTO: 180 K/UL (ref 150–450)
PMV BLD AUTO: 11.1 FL (ref 9.4–12.3)
RBC # BLD AUTO: 4.8 M/UL (ref 4.05–5.2)
WBC # BLD AUTO: 9.2 K/UL (ref 4.3–11.1)

## 2023-06-02 LAB
ALBUMIN SERPL-MCNC: 3.7 G/DL (ref 3.5–5)
ALBUMIN/GLOB SERPL: 1.1 (ref 0.4–1.6)
ALP SERPL-CCNC: 97 U/L (ref 50–136)
ALT SERPL-CCNC: 24 U/L (ref 12–65)
ANION GAP SERPL CALC-SCNC: 7 MMOL/L (ref 2–11)
AST SERPL-CCNC: 13 U/L (ref 15–37)
BILIRUB SERPL-MCNC: 0.2 MG/DL (ref 0.2–1.1)
BUN SERPL-MCNC: 14 MG/DL (ref 6–23)
CALCIUM SERPL-MCNC: 9.1 MG/DL (ref 8.3–10.4)
CHLORIDE SERPL-SCNC: 105 MMOL/L (ref 101–110)
CHOLEST SERPL-MCNC: 158 MG/DL
CO2 SERPL-SCNC: 25 MMOL/L (ref 21–32)
CREAT SERPL-MCNC: 0.7 MG/DL (ref 0.6–1)
GLOBULIN SER CALC-MCNC: 3.4 G/DL (ref 2.8–4.5)
GLUCOSE SERPL-MCNC: 139 MG/DL (ref 65–100)
HDLC SERPL-MCNC: 37 MG/DL (ref 40–60)
HDLC SERPL: 4.3
LDLC SERPL CALC-MCNC: 53.8 MG/DL
LDLC SERPL DIRECT ASSAY-MCNC: 93 MG/DL
POTASSIUM SERPL-SCNC: 4.2 MMOL/L (ref 3.5–5.1)
PROT SERPL-MCNC: 7.1 G/DL (ref 6.3–8.2)
SODIUM SERPL-SCNC: 137 MMOL/L (ref 133–143)
T4 FREE SERPL-MCNC: 0.9 NG/DL (ref 0.78–1.46)
TRIGL SERPL-MCNC: 336 MG/DL (ref 35–150)
TSH, 3RD GENERATION: 3.36 UIU/ML (ref 0.36–3.74)
VLDLC SERPL CALC-MCNC: 67.2 MG/DL (ref 6–23)

## 2023-06-06 ENCOUNTER — OFFICE VISIT (OUTPATIENT)
Dept: PRIMARY CARE CLINIC | Facility: CLINIC | Age: 53
End: 2023-06-06
Payer: MEDICARE

## 2023-06-06 VITALS
HEIGHT: 62 IN | TEMPERATURE: 98 F | WEIGHT: 197 LBS | OXYGEN SATURATION: 97 % | BODY MASS INDEX: 36.25 KG/M2 | HEART RATE: 86 BPM | DIASTOLIC BLOOD PRESSURE: 73 MMHG | SYSTOLIC BLOOD PRESSURE: 123 MMHG

## 2023-06-06 DIAGNOSIS — H66.91 OM (OTITIS MEDIA), RECURRENT, RIGHT: ICD-10-CM

## 2023-06-06 DIAGNOSIS — R73.09 ELEVATED GLUCOSE: ICD-10-CM

## 2023-06-06 DIAGNOSIS — Z72.0 TOBACCO ABUSE DISORDER: ICD-10-CM

## 2023-06-06 DIAGNOSIS — E78.2 MIXED HYPERLIPIDEMIA: ICD-10-CM

## 2023-06-06 DIAGNOSIS — E06.3 HYPOTHYROIDISM DUE TO HASHIMOTO'S THYROIDITIS: ICD-10-CM

## 2023-06-06 DIAGNOSIS — E03.8 HYPOTHYROIDISM DUE TO HASHIMOTO'S THYROIDITIS: ICD-10-CM

## 2023-06-06 DIAGNOSIS — F51.01 PRIMARY INSOMNIA: ICD-10-CM

## 2023-06-06 DIAGNOSIS — E11.9 TYPE 2 DIABETES MELLITUS WITHOUT COMPLICATION, WITHOUT LONG-TERM CURRENT USE OF INSULIN (HCC): Primary | ICD-10-CM

## 2023-06-06 DIAGNOSIS — K21.9 GASTROESOPHAGEAL REFLUX DISEASE WITHOUT ESOPHAGITIS: ICD-10-CM

## 2023-06-06 DIAGNOSIS — J41.1 MUCOPURULENT CHRONIC BRONCHITIS (HCC): ICD-10-CM

## 2023-06-06 DIAGNOSIS — I10 ESSENTIAL HYPERTENSION: ICD-10-CM

## 2023-06-06 DIAGNOSIS — E78.2 ELEVATED TRIGLYCERIDES WITH HIGH CHOLESTEROL: ICD-10-CM

## 2023-06-06 LAB
EST. AVERAGE GLUCOSE BLD GHB EST-MCNC: 137 MG/DL
HBA1C MFR BLD: 6.4 % (ref 4.8–5.6)

## 2023-06-06 PROCEDURE — 3044F HG A1C LEVEL LT 7.0%: CPT | Performed by: FAMILY MEDICINE

## 2023-06-06 PROCEDURE — 3023F SPIROM DOC REV: CPT | Performed by: FAMILY MEDICINE

## 2023-06-06 PROCEDURE — 4004F PT TOBACCO SCREEN RCVD TLK: CPT | Performed by: FAMILY MEDICINE

## 2023-06-06 PROCEDURE — 3017F COLORECTAL CA SCREEN DOC REV: CPT | Performed by: FAMILY MEDICINE

## 2023-06-06 PROCEDURE — 3074F SYST BP LT 130 MM HG: CPT | Performed by: FAMILY MEDICINE

## 2023-06-06 PROCEDURE — 3078F DIAST BP <80 MM HG: CPT | Performed by: FAMILY MEDICINE

## 2023-06-06 PROCEDURE — 2022F DILAT RTA XM EVC RTNOPTHY: CPT | Performed by: FAMILY MEDICINE

## 2023-06-06 PROCEDURE — G8417 CALC BMI ABV UP PARAM F/U: HCPCS | Performed by: FAMILY MEDICINE

## 2023-06-06 PROCEDURE — G8427 DOCREV CUR MEDS BY ELIG CLIN: HCPCS | Performed by: FAMILY MEDICINE

## 2023-06-06 PROCEDURE — 99214 OFFICE O/P EST MOD 30 MIN: CPT | Performed by: FAMILY MEDICINE

## 2023-06-06 RX ORDER — LEVOTHYROXINE SODIUM 0.05 MG/1
50 TABLET ORAL DAILY
Qty: 90 TABLET | Refills: 1 | Status: SHIPPED | OUTPATIENT
Start: 2023-06-06

## 2023-06-06 RX ORDER — AZITHROMYCIN 500 MG/1
500 TABLET, FILM COATED ORAL DAILY
Qty: 6 TABLET | Refills: 0 | Status: SHIPPED | OUTPATIENT
Start: 2023-06-06 | End: 2023-06-12

## 2023-06-06 RX ORDER — ZOLPIDEM TARTRATE 10 MG/1
10 TABLET ORAL NIGHTLY PRN
Qty: 30 TABLET | Refills: 2 | Status: SHIPPED | OUTPATIENT
Start: 2023-06-06 | End: 2023-09-04

## 2023-06-06 RX ORDER — LISINOPRIL AND HYDROCHLOROTHIAZIDE 12.5; 1 MG/1; MG/1
1 TABLET ORAL DAILY
Qty: 90 TABLET | Refills: 3 | Status: SHIPPED | OUTPATIENT
Start: 2023-06-06

## 2023-06-06 RX ORDER — HYDROCHLOROTHIAZIDE 25 MG/1
25 TABLET ORAL DAILY
Qty: 90 TABLET | Refills: 1 | Status: SHIPPED | OUTPATIENT
Start: 2023-06-06

## 2023-06-06 RX ORDER — ICOSAPENT ETHYL 1000 MG/1
CAPSULE ORAL
Qty: 360 CAPSULE | Refills: 1 | Status: SHIPPED | OUTPATIENT
Start: 2023-06-06

## 2023-06-06 RX ORDER — ALBUTEROL SULFATE 90 UG/1
2 AEROSOL, METERED RESPIRATORY (INHALATION) EVERY 6 HOURS PRN
Qty: 18 G | Refills: 5 | Status: SHIPPED | OUTPATIENT
Start: 2023-06-06

## 2023-06-06 RX ORDER — ATORVASTATIN CALCIUM 40 MG/1
40 TABLET, FILM COATED ORAL DAILY
Qty: 90 TABLET | Refills: 3 | Status: SHIPPED | OUTPATIENT
Start: 2023-06-06

## 2023-06-06 RX ORDER — OMEPRAZOLE 40 MG/1
40 CAPSULE, DELAYED RELEASE ORAL DAILY
Qty: 90 CAPSULE | Refills: 1 | Status: SHIPPED | OUTPATIENT
Start: 2023-06-06

## 2023-06-06 RX ORDER — FLUTICASONE PROPIONATE AND SALMETEROL 250; 50 UG/1; UG/1
1 POWDER RESPIRATORY (INHALATION) EVERY 12 HOURS
Qty: 60 EACH | Refills: 5 | Status: SHIPPED | OUTPATIENT
Start: 2023-06-06

## 2023-06-06 ASSESSMENT — PATIENT HEALTH QUESTIONNAIRE - PHQ9
SUM OF ALL RESPONSES TO PHQ9 QUESTIONS 1 & 2: 0
SUM OF ALL RESPONSES TO PHQ QUESTIONS 1-9: 0
2. FEELING DOWN, DEPRESSED OR HOPELESS: 0
SUM OF ALL RESPONSES TO PHQ QUESTIONS 1-9: 0
SUM OF ALL RESPONSES TO PHQ QUESTIONS 1-9: 0
1. LITTLE INTEREST OR PLEASURE IN DOING THINGS: 0
SUM OF ALL RESPONSES TO PHQ QUESTIONS 1-9: 0

## 2023-06-06 NOTE — PROGRESS NOTES
Eduardo Medina MD  [address] [place], [place] [address]  Ph No:  [phone]  Fax:  [phone]:  Chief Complaint   Patient presents with    3 Month Follow-Up     Pt in for 3 month follow up. Pt has no issues          HISTORY OF PRESENT ILLNESS:  Ms. Domingo Munoz is a 50 y.o. female. Pt presents for 3 months follow up. Pt reports no particular issues, except those identified below. Pt is treated for URI, lungs, sinuses, zithromax. Labs are reviewed with pt as follows:  Gfr >60 normal,  healthy kidney function  Fasting glucose 139,  hgba1c 6.4%, controlled diabetes  Total cholesterol 158, hdl 37, ldl 53.8, ratio of cardiac risk high normal 4.3  Liver enzymes normal alt 24, ast 13  Thyroid normal ths 3.360, free t4 0.9  Hgb normal 14.7, mcv 96.0 normal    Pt is given the following medications:  Omeprazole for acid reflux, pt reports needed as her reflux is severe  Lisinopril-hctz and hctz for BP management BP stable 123/73, continue current therapy  Levothyroxine for hypothyroidism,stable on current dose  Vascept for triglycerides management, triglycerides 336, improving from 419 but needs adjustment of meds to 2 in am and 2 in pm for prescription dose. Expect additional reduction. Advair and albuterol for persistent asthma management/copd  Lipitor for cholesterol management, see above notes  Ambien for sleep, working as expected  Zithromax is given for URI, congestion lungs, sinuses. Pt will return to clinic in 3 months, recheck, cmp, lipid, hgba1c (checking triglycerides)              Pt has swelling on left side upper chest,, and some damage in right TM with changes of bony structures. .  Pt will contact back to PCP for referral to ENT      HISTORY:  No Known Allergies  Past Medical History:   Diagnosis Date    Arthritis     osteo    Chronic back pain     Cigarette smoker     COPD (chronic obstructive pulmonary disease) (HCC)     prn inhaler-

## 2023-06-19 ENCOUNTER — TELEPHONE (OUTPATIENT)
Dept: PRIMARY CARE CLINIC | Facility: CLINIC | Age: 53
End: 2023-06-19

## 2023-06-19 DIAGNOSIS — R22.1 NECK SWELLING: Primary | ICD-10-CM

## 2023-07-17 ENCOUNTER — OFFICE VISIT (OUTPATIENT)
Dept: ENT CLINIC | Age: 53
End: 2023-07-17
Payer: MEDICARE

## 2023-07-17 VITALS
BODY MASS INDEX: 35.51 KG/M2 | HEIGHT: 62 IN | RESPIRATION RATE: 17 BRPM | OXYGEN SATURATION: 95 % | WEIGHT: 193 LBS | HEART RATE: 86 BPM

## 2023-07-17 DIAGNOSIS — K11.20 SIALADENITIS: Primary | ICD-10-CM

## 2023-07-17 DIAGNOSIS — H65.91 OME (OTITIS MEDIA WITH EFFUSION), RIGHT: ICD-10-CM

## 2023-07-17 DIAGNOSIS — H69.83 ETD (EUSTACHIAN TUBE DYSFUNCTION), BILATERAL: ICD-10-CM

## 2023-07-17 PROCEDURE — G8417 CALC BMI ABV UP PARAM F/U: HCPCS | Performed by: OTOLARYNGOLOGY

## 2023-07-17 PROCEDURE — 4004F PT TOBACCO SCREEN RCVD TLK: CPT | Performed by: OTOLARYNGOLOGY

## 2023-07-17 PROCEDURE — 99204 OFFICE O/P NEW MOD 45 MIN: CPT | Performed by: OTOLARYNGOLOGY

## 2023-07-17 PROCEDURE — 3017F COLORECTAL CA SCREEN DOC REV: CPT | Performed by: OTOLARYNGOLOGY

## 2023-07-17 PROCEDURE — G8428 CUR MEDS NOT DOCUMENT: HCPCS | Performed by: OTOLARYNGOLOGY

## 2023-07-17 RX ORDER — NALOXONE HYDROCHLORIDE 4 MG/.1ML
SPRAY NASAL
COMMUNITY
Start: 2023-07-07

## 2023-07-17 RX ORDER — NAFTIFINE HYDROCHLORIDE 2 G/100G
GEL TOPICAL
COMMUNITY
Start: 2023-06-22

## 2023-07-17 ASSESSMENT — ENCOUNTER SYMPTOMS
DIARRHEA: 0
EYE ITCHING: 0
CHOKING: 0
SINUS PAIN: 0
FACIAL SWELLING: 0
WHEEZING: 0
SINUS PRESSURE: 0
CONSTIPATION: 0
SHORTNESS OF BREATH: 0
EYE PAIN: 0
APNEA: 0
NAUSEA: 0
COUGH: 0
STRIDOR: 0
EYE DISCHARGE: 0

## 2023-07-17 NOTE — PROGRESS NOTES
Chief Complaint   Patient presents with    Neck Swelling     Patient presents today with c/o neck swelling x patient states that this has occurred off and on for a long period of time . Patient also has noticed R ear fullness. HPI:  Charanjit Cronin is a 50 y.o. female seen today in initial consultation. She complains of a stopped up sensation in her right ear for the past couple of years. She has noted more muffled hearing recently on the right side. There is no otalgia or otorrhea. When she sweats outside, her right ear will feel even more stopped up and this sensation can last for few weeks. She admits to Q-tip use about once a week. She has no previous otologic history. On another note, she also reports intermittent swelling of her left upper neck over the past year. She has had significant swelling on that left submandibular region at least 3 times this past year which has even been treated with oral antibiotics. She denies any previous salivary pathology, and there is no chronic sore throat, dysphagia, or hoarseness. No imaging studies yet. Past Medical History, Past Surgical History, Family history, Social History, and Medications were all reviewed with the patient today and updated as necessary.      No Known Allergies  Patient Active Problem List   Diagnosis    Lateral epicondylitis, right elbow    Edema    Encounter for smoking cessation counseling    Hyperlipemia    Mixed hyperlipidemia    Severe obesity ([address])    Umbilical hernia without obstruction and without gangrene    Insomnia, unspecified    S/P umbilical hernia repair, follow-up exam    Recurrent umbilical hernia    Fibromyalgia    Rash and nonspecific skin eruption    Essential hypertension    Gastroesophageal reflux disease without esophagitis    Elevated fasting glucose    Screening for cardiovascular condition    Weight loss counseling, encounter for    Elevated triglycerides with high cholesterol    Family history of

## 2023-08-31 DIAGNOSIS — R79.9 ABNORMAL BLOOD CHEMISTRY: ICD-10-CM

## 2023-08-31 DIAGNOSIS — E03.8 TOXIC DIFFUSE GOITER WITH PRETIBIAL MYXEDEMA: ICD-10-CM

## 2023-08-31 DIAGNOSIS — E05.00 TOXIC DIFFUSE GOITER WITH PRETIBIAL MYXEDEMA: ICD-10-CM

## 2023-08-31 DIAGNOSIS — R73.01 IFG (IMPAIRED FASTING GLUCOSE): ICD-10-CM

## 2023-08-31 DIAGNOSIS — Z13.228 SCREENING FOR PHENYLKETONURIA (PKU): ICD-10-CM

## 2023-08-31 DIAGNOSIS — R73.01 IFG (IMPAIRED FASTING GLUCOSE): Primary | ICD-10-CM

## 2023-08-31 DIAGNOSIS — I51.9 MYXEDEMA HEART DISEASE: ICD-10-CM

## 2023-08-31 DIAGNOSIS — E03.9 MYXEDEMA HEART DISEASE: ICD-10-CM

## 2023-08-31 DIAGNOSIS — H53.8 ACCOMMODATIVE FATIGUE: ICD-10-CM

## 2023-08-31 LAB
ALBUMIN SERPL-MCNC: 3.5 G/DL (ref 3.5–5)
ALBUMIN/GLOB SERPL: 1 (ref 0.4–1.6)
ALP SERPL-CCNC: 118 U/L (ref 50–136)
ALT SERPL-CCNC: 21 U/L (ref 12–65)
ANION GAP SERPL CALC-SCNC: 7 MMOL/L (ref 2–11)
AST SERPL-CCNC: 15 U/L (ref 15–37)
BASOPHILS # BLD: 0.1 K/UL (ref 0–0.2)
BASOPHILS NFR BLD: 1 % (ref 0–2)
BILIRUB SERPL-MCNC: 0.2 MG/DL (ref 0.2–1.1)
BUN SERPL-MCNC: 18 MG/DL (ref 6–23)
CALCIUM SERPL-MCNC: 8.8 MG/DL (ref 8.3–10.4)
CHLORIDE SERPL-SCNC: 111 MMOL/L (ref 101–110)
CHOLEST SERPL-MCNC: 144 MG/DL
CO2 SERPL-SCNC: 24 MMOL/L (ref 21–32)
CREAT SERPL-MCNC: 0.8 MG/DL (ref 0.6–1)
DIFFERENTIAL METHOD BLD: ABNORMAL
EOSINOPHIL # BLD: 0 K/UL (ref 0–0.8)
EOSINOPHIL NFR BLD: 0 % (ref 0.5–7.8)
ERYTHROCYTE [DISTWIDTH] IN BLOOD BY AUTOMATED COUNT: 13.6 % (ref 11.9–14.6)
EST. AVERAGE GLUCOSE BLD GHB EST-MCNC: 131 MG/DL
GLOBULIN SER CALC-MCNC: 3.5 G/DL (ref 2.8–4.5)
GLUCOSE SERPL-MCNC: 129 MG/DL (ref 65–100)
HBA1C MFR BLD: 6.2 % (ref 4.8–5.6)
HCT VFR BLD AUTO: 47 % (ref 35.8–46.3)
HDLC SERPL-MCNC: 36 MG/DL (ref 40–60)
HDLC SERPL: 4
HGB BLD-MCNC: 15.1 G/DL (ref 11.7–15.4)
IMM GRANULOCYTES # BLD AUTO: 0 K/UL (ref 0–0.5)
IMM GRANULOCYTES NFR BLD AUTO: 0 % (ref 0–5)
LDLC SERPL CALC-MCNC: 62.2 MG/DL
LYMPHOCYTES # BLD: 2.2 K/UL (ref 0.5–4.6)
LYMPHOCYTES NFR BLD: 28 % (ref 13–44)
MCH RBC QN AUTO: 30.1 PG (ref 26.1–32.9)
MCHC RBC AUTO-ENTMCNC: 32.1 G/DL (ref 31.4–35)
MCV RBC AUTO: 93.6 FL (ref 82–102)
MONOCYTES # BLD: 0.4 K/UL (ref 0.1–1.3)
MONOCYTES NFR BLD: 5 % (ref 4–12)
NEUTS SEG # BLD: 5 K/UL (ref 1.7–8.2)
NEUTS SEG NFR BLD: 66 % (ref 43–78)
NRBC # BLD: 0 K/UL (ref 0–0.2)
PLATELET # BLD AUTO: 175 K/UL (ref 150–450)
PMV BLD AUTO: 11.2 FL (ref 9.4–12.3)
POTASSIUM SERPL-SCNC: 4.5 MMOL/L (ref 3.5–5.1)
PROT SERPL-MCNC: 7 G/DL (ref 6.3–8.2)
RBC # BLD AUTO: 5.02 M/UL (ref 4.05–5.2)
SODIUM SERPL-SCNC: 142 MMOL/L (ref 133–143)
T4 FREE SERPL-MCNC: 0.8 NG/DL (ref 0.78–1.46)
TRIGL SERPL-MCNC: 229 MG/DL (ref 35–150)
VLDLC SERPL CALC-MCNC: 45.8 MG/DL (ref 6–23)
WBC # BLD AUTO: 7.7 K/UL (ref 4.3–11.1)

## 2023-09-04 ASSESSMENT — PATIENT HEALTH QUESTIONNAIRE - PHQ9
SUM OF ALL RESPONSES TO PHQ9 QUESTIONS 1 & 2: 0
1. LITTLE INTEREST OR PLEASURE IN DOING THINGS: 0
SUM OF ALL RESPONSES TO PHQ QUESTIONS 1-9: 0
2. FEELING DOWN, DEPRESSED OR HOPELESS: 0
SUM OF ALL RESPONSES TO PHQ9 QUESTIONS 1 & 2: 0
2. FEELING DOWN, DEPRESSED OR HOPELESS: NOT AT ALL
1. LITTLE INTEREST OR PLEASURE IN DOING THINGS: NOT AT ALL
SUM OF ALL RESPONSES TO PHQ QUESTIONS 1-9: 0

## 2023-09-07 ENCOUNTER — OFFICE VISIT (OUTPATIENT)
Dept: PRIMARY CARE CLINIC | Facility: CLINIC | Age: 53
End: 2023-09-07

## 2023-09-07 VITALS
HEIGHT: 62 IN | DIASTOLIC BLOOD PRESSURE: 76 MMHG | TEMPERATURE: 98.1 F | SYSTOLIC BLOOD PRESSURE: 124 MMHG | BODY MASS INDEX: 35.7 KG/M2 | WEIGHT: 194 LBS | OXYGEN SATURATION: 97 % | HEART RATE: 86 BPM

## 2023-09-07 DIAGNOSIS — E03.8 HYPOTHYROIDISM DUE TO HASHIMOTO'S THYROIDITIS: ICD-10-CM

## 2023-09-07 DIAGNOSIS — F51.01 PRIMARY INSOMNIA: Primary | ICD-10-CM

## 2023-09-07 DIAGNOSIS — E78.2 MIXED HYPERLIPIDEMIA: ICD-10-CM

## 2023-09-07 DIAGNOSIS — I10 ESSENTIAL HYPERTENSION: ICD-10-CM

## 2023-09-07 DIAGNOSIS — Z72.0 TOBACCO ABUSE DISORDER: ICD-10-CM

## 2023-09-07 DIAGNOSIS — E78.2 ELEVATED TRIGLYCERIDES WITH HIGH CHOLESTEROL: ICD-10-CM

## 2023-09-07 DIAGNOSIS — J41.1 MUCOPURULENT CHRONIC BRONCHITIS (HCC): ICD-10-CM

## 2023-09-07 DIAGNOSIS — K21.9 GASTROESOPHAGEAL REFLUX DISEASE WITHOUT ESOPHAGITIS: ICD-10-CM

## 2023-09-07 DIAGNOSIS — E06.3 HYPOTHYROIDISM DUE TO HASHIMOTO'S THYROIDITIS: ICD-10-CM

## 2023-09-07 RX ORDER — LEVOTHYROXINE SODIUM 0.05 MG/1
50 TABLET ORAL DAILY
Qty: 90 TABLET | Refills: 1 | Status: SHIPPED | OUTPATIENT
Start: 2023-09-07

## 2023-09-07 RX ORDER — ZOLPIDEM TARTRATE 10 MG/1
TABLET ORAL NIGHTLY PRN
COMMUNITY
End: 2023-09-07 | Stop reason: SDUPTHER

## 2023-09-07 RX ORDER — FLUTICASONE PROPIONATE AND SALMETEROL 250; 50 UG/1; UG/1
1 POWDER RESPIRATORY (INHALATION) EVERY 12 HOURS
Qty: 60 EACH | Refills: 5 | Status: SHIPPED | OUTPATIENT
Start: 2023-09-07

## 2023-09-07 RX ORDER — OMEPRAZOLE 40 MG/1
40 CAPSULE, DELAYED RELEASE ORAL DAILY
Qty: 90 CAPSULE | Refills: 1 | Status: SHIPPED | OUTPATIENT
Start: 2023-09-07

## 2023-09-07 RX ORDER — HYDROCHLOROTHIAZIDE 25 MG/1
25 TABLET ORAL DAILY
Qty: 90 TABLET | Refills: 1 | Status: SHIPPED | OUTPATIENT
Start: 2023-09-07

## 2023-09-07 RX ORDER — ICOSAPENT ETHYL 1000 MG/1
CAPSULE ORAL
Qty: 360 CAPSULE | Refills: 1 | Status: SHIPPED | OUTPATIENT
Start: 2023-09-07

## 2023-09-07 RX ORDER — ALBUTEROL SULFATE 90 UG/1
2 AEROSOL, METERED RESPIRATORY (INHALATION) EVERY 6 HOURS PRN
Qty: 18 G | Refills: 5 | Status: SHIPPED | OUTPATIENT
Start: 2023-09-07

## 2023-09-07 RX ORDER — ZOLPIDEM TARTRATE 10 MG/1
10 TABLET ORAL NIGHTLY PRN
Qty: 30 TABLET | Refills: 5 | Status: SHIPPED | OUTPATIENT
Start: 2023-09-07 | End: 2024-03-05

## 2023-09-07 NOTE — PROGRESS NOTES
Nat Rodney MD  [address] [place], [address] [facility]  Ph No:  [phone]  Fax:  [phone]    CHIEF COMPLAINT:  Chief Complaint   Patient presents with    3 Month Follow-Up    Discuss Labs    Insomnia     Follow up, med refill           HISTORY OF PRESENT ILLNESS:  Ms. Tayo Young is a 50 y.o. female. Pt presents for 3 months follow up. Pt reports she is doing okay. PT presents for lab review. Her labs are reviewed as follows:  Potassium 4.5 normal  GFR>60 normal kidney function  Fasting glucose 129, hgba1c 6.2% well controlled glucose  Total cholesterol 144, hdl 36, ldl 62.2 ratio of cardiac risk 4.0 normal  Liver enzymes normal alt 21, ast 15  Thyroid free t4 0.8 normal  Hgb normal 15.1, mcv normal 93.6. Pt has mostly normal labs. Medication refills are given as follows:  Ambien for sleep, pt reports working effectively for her with minimal side effects. Omeprazole for acid reflux, working as expected. Synthyroid for hypothyroidism, stable on current dose  Vascepa for triglycerides, stable  Hctz for edema  Advair for copd maintenance, albuterol for same    Pt will RTC in 6 months, recheck, cmp, cbc, tsh, free t4, hgba1c, vit d            Pt is given refills for 6 months. HISTORY:  No Known Allergies  Past Medical History:   Diagnosis Date    Arthritis     osteo    Chronic back pain     Cigarette smoker     COPD (chronic obstructive pulmonary disease) (HCC)     prn inhaler-    Esophageal reflux     prn med    Fibromyalgia     Hearing loss     Right ear feel lots of fluid    High triglycerides     managed well with meds    Insomnia     Mixed hyperlipidemia 2014    Right elbow pain     Sleep apnea 2021     Past Surgical History:   Procedure Laterality Date    APPENDECTOMY       SECTION      X2    COLONOSCOPY      HERNIA REPAIR      umbilical    HYSTERECTOMY (CERVIX STATUS UNKNOWN)      AGE 30?     HYSTERECTOMY, TOTAL ABDOMINAL (CERVIX

## 2024-02-29 ENCOUNTER — NURSE ONLY (OUTPATIENT)
Dept: PRIMARY CARE CLINIC | Facility: CLINIC | Age: 54
End: 2024-02-29

## 2024-02-29 DIAGNOSIS — R94.6 NONSPECIFIC ABNORMAL RESULTS OF THYROID FUNCTION STUDY: ICD-10-CM

## 2024-02-29 DIAGNOSIS — R79.9 ABNORMAL BLOOD CHEMISTRY: ICD-10-CM

## 2024-02-29 DIAGNOSIS — Z13.228 SCREENING FOR METABOLIC DISORDER: ICD-10-CM

## 2024-02-29 DIAGNOSIS — R73.01 IFG (IMPAIRED FASTING GLUCOSE): ICD-10-CM

## 2024-02-29 DIAGNOSIS — E78.5 HYPERLIPIDEMIA, UNSPECIFIED HYPERLIPIDEMIA TYPE: ICD-10-CM

## 2024-02-29 DIAGNOSIS — R79.9 ABNORMAL BLOOD CHEMISTRY: Primary | ICD-10-CM

## 2024-02-29 DIAGNOSIS — R53.83 FATIGUE, UNSPECIFIED TYPE: ICD-10-CM

## 2024-02-29 LAB
ALBUMIN SERPL-MCNC: 3.6 G/DL (ref 3.5–5)
ALBUMIN/GLOB SERPL: 1.1 (ref 0.4–1.6)
ALP SERPL-CCNC: 121 U/L (ref 50–136)
ALT SERPL-CCNC: 23 U/L (ref 12–65)
ANION GAP SERPL CALC-SCNC: 3 MMOL/L (ref 2–11)
AST SERPL-CCNC: 13 U/L (ref 15–37)
BASOPHILS # BLD: 0.1 K/UL (ref 0–0.2)
BASOPHILS NFR BLD: 1 % (ref 0–2)
BILIRUB SERPL-MCNC: 0.2 MG/DL (ref 0.2–1.1)
BUN SERPL-MCNC: 12 MG/DL (ref 6–23)
CALCIUM SERPL-MCNC: 9 MG/DL (ref 8.3–10.4)
CHLORIDE SERPL-SCNC: 110 MMOL/L (ref 103–113)
CHOLEST SERPL-MCNC: 151 MG/DL
CO2 SERPL-SCNC: 27 MMOL/L (ref 21–32)
CREAT SERPL-MCNC: 0.7 MG/DL (ref 0.6–1)
DIFFERENTIAL METHOD BLD: ABNORMAL
EOSINOPHIL # BLD: 0 K/UL (ref 0–0.8)
EOSINOPHIL NFR BLD: 0 % (ref 0.5–7.8)
ERYTHROCYTE [DISTWIDTH] IN BLOOD BY AUTOMATED COUNT: 13.4 % (ref 11.9–14.6)
EST. AVERAGE GLUCOSE BLD GHB EST-MCNC: 137 MG/DL
GLOBULIN SER CALC-MCNC: 3.3 G/DL (ref 2.8–4.5)
GLUCOSE SERPL-MCNC: 119 MG/DL (ref 65–100)
HBA1C MFR BLD: 6.4 % (ref 4.8–5.6)
HCT VFR BLD AUTO: 46.4 % (ref 35.8–46.3)
HDLC SERPL-MCNC: 36 MG/DL (ref 40–60)
HDLC SERPL: 4.2
HGB BLD-MCNC: 14.7 G/DL (ref 11.7–15.4)
IMM GRANULOCYTES # BLD AUTO: 0 K/UL (ref 0–0.5)
IMM GRANULOCYTES NFR BLD AUTO: 0 % (ref 0–5)
LDLC SERPL CALC-MCNC: 57 MG/DL
LYMPHOCYTES # BLD: 2.7 K/UL (ref 0.5–4.6)
LYMPHOCYTES NFR BLD: 28 % (ref 13–44)
MCH RBC QN AUTO: 30 PG (ref 26.1–32.9)
MCHC RBC AUTO-ENTMCNC: 31.7 G/DL (ref 31.4–35)
MCV RBC AUTO: 94.7 FL (ref 82–102)
MONOCYTES # BLD: 0.6 K/UL (ref 0.1–1.3)
MONOCYTES NFR BLD: 6 % (ref 4–12)
NEUTS SEG # BLD: 6.3 K/UL (ref 1.7–8.2)
NEUTS SEG NFR BLD: 65 % (ref 43–78)
NRBC # BLD: 0 K/UL (ref 0–0.2)
PLATELET # BLD AUTO: 184 K/UL (ref 150–450)
PMV BLD AUTO: 10.9 FL (ref 9.4–12.3)
POTASSIUM SERPL-SCNC: 4.1 MMOL/L (ref 3.5–5.1)
PROT SERPL-MCNC: 6.9 G/DL (ref 6.3–8.2)
RBC # BLD AUTO: 4.9 M/UL (ref 4.05–5.2)
SODIUM SERPL-SCNC: 140 MMOL/L (ref 136–146)
T4 FREE SERPL-MCNC: 0.8 NG/DL (ref 0.78–1.46)
TRIGL SERPL-MCNC: 290 MG/DL (ref 35–150)
TSH, 3RD GENERATION: 4.38 UIU/ML (ref 0.36–3.74)
VLDLC SERPL CALC-MCNC: 58 MG/DL (ref 6–23)
WBC # BLD AUTO: 9.7 K/UL (ref 4.3–11.1)

## 2024-03-15 ENCOUNTER — OFFICE VISIT (OUTPATIENT)
Dept: PRIMARY CARE CLINIC | Facility: CLINIC | Age: 54
End: 2024-03-15

## 2024-03-15 VITALS
OXYGEN SATURATION: 96 % | HEART RATE: 80 BPM | WEIGHT: 204 LBS | BODY MASS INDEX: 37.31 KG/M2 | DIASTOLIC BLOOD PRESSURE: 64 MMHG | SYSTOLIC BLOOD PRESSURE: 124 MMHG | TEMPERATURE: 97.2 F

## 2024-03-15 DIAGNOSIS — Z79.899 HIGH RISK MEDICATION USE: ICD-10-CM

## 2024-03-15 DIAGNOSIS — E06.3 HYPOTHYROIDISM DUE TO HASHIMOTO'S THYROIDITIS: ICD-10-CM

## 2024-03-15 DIAGNOSIS — K21.9 GASTROESOPHAGEAL REFLUX DISEASE WITHOUT ESOPHAGITIS: ICD-10-CM

## 2024-03-15 DIAGNOSIS — Z12.11 SCREENING FOR COLON CANCER: ICD-10-CM

## 2024-03-15 DIAGNOSIS — F11.24 OPIOID DEPENDENCE WITH OPIOID-INDUCED MOOD DISORDER (HCC): ICD-10-CM

## 2024-03-15 DIAGNOSIS — E78.2 ELEVATED TRIGLYCERIDES WITH HIGH CHOLESTEROL: ICD-10-CM

## 2024-03-15 DIAGNOSIS — I10 ESSENTIAL HYPERTENSION: ICD-10-CM

## 2024-03-15 DIAGNOSIS — J41.1 MUCOPURULENT CHRONIC BRONCHITIS (HCC): ICD-10-CM

## 2024-03-15 DIAGNOSIS — E11.9 TYPE 2 DIABETES MELLITUS WITHOUT COMPLICATION, WITHOUT LONG-TERM CURRENT USE OF INSULIN (HCC): ICD-10-CM

## 2024-03-15 DIAGNOSIS — E78.2 MIXED HYPERLIPIDEMIA: ICD-10-CM

## 2024-03-15 DIAGNOSIS — E03.8 HYPOTHYROIDISM DUE TO HASHIMOTO'S THYROIDITIS: ICD-10-CM

## 2024-03-15 DIAGNOSIS — E11.9 TYPE 2 DIABETES MELLITUS WITHOUT COMPLICATION, WITHOUT LONG-TERM CURRENT USE OF INSULIN (HCC): Primary | ICD-10-CM

## 2024-03-15 DIAGNOSIS — E66.01 SEVERE OBESITY (HCC): ICD-10-CM

## 2024-03-15 DIAGNOSIS — Z72.0 TOBACCO ABUSE DISORDER: ICD-10-CM

## 2024-03-15 DIAGNOSIS — F51.01 PRIMARY INSOMNIA: ICD-10-CM

## 2024-03-15 PROBLEM — K42.9 RECURRENT UMBILICAL HERNIA: Status: RESOLVED | Noted: 2019-09-03 | Resolved: 2024-03-15

## 2024-03-15 PROBLEM — J06.9 UPPER RESPIRATORY TRACT INFECTION: Status: RESOLVED | Noted: 2023-03-07 | Resolved: 2024-03-15

## 2024-03-15 PROBLEM — K42.9 UMBILICAL HERNIA WITHOUT OBSTRUCTION AND WITHOUT GANGRENE: Status: RESOLVED | Noted: 2019-09-05 | Resolved: 2024-03-15

## 2024-03-15 RX ORDER — HYDROCHLOROTHIAZIDE 25 MG/1
25 TABLET ORAL DAILY
Qty: 90 TABLET | Refills: 1 | Status: CANCELLED | OUTPATIENT
Start: 2024-03-15

## 2024-03-15 RX ORDER — LEVOTHYROXINE SODIUM 0.05 MG/1
50 TABLET ORAL DAILY
Qty: 90 TABLET | Refills: 1 | Status: SHIPPED | OUTPATIENT
Start: 2024-03-15

## 2024-03-15 RX ORDER — FENOFIBRATE 48 MG/1
48 TABLET, COATED ORAL DAILY
Qty: 90 TABLET | Refills: 1 | Status: SHIPPED | OUTPATIENT
Start: 2024-03-15 | End: 2024-06-13

## 2024-03-15 RX ORDER — ZOLPIDEM TARTRATE 10 MG/1
10 TABLET ORAL NIGHTLY PRN
Qty: 30 TABLET | Refills: 2 | Status: SHIPPED | OUTPATIENT
Start: 2024-03-15 | End: 2024-09-11

## 2024-03-15 RX ORDER — OMEPRAZOLE 40 MG/1
40 CAPSULE, DELAYED RELEASE ORAL DAILY
Qty: 90 CAPSULE | Refills: 1 | Status: SHIPPED | OUTPATIENT
Start: 2024-03-15

## 2024-03-15 RX ORDER — ATORVASTATIN CALCIUM 40 MG/1
40 TABLET, FILM COATED ORAL DAILY
Qty: 90 TABLET | Refills: 3 | Status: CANCELLED | OUTPATIENT
Start: 2024-03-15

## 2024-03-15 RX ORDER — ICOSAPENT ETHYL 1000 MG/1
CAPSULE ORAL
Qty: 360 CAPSULE | Refills: 1 | Status: CANCELLED | OUTPATIENT
Start: 2024-03-15

## 2024-03-15 RX ORDER — LISINOPRIL AND HYDROCHLOROTHIAZIDE 12.5; 1 MG/1; MG/1
1 TABLET ORAL DAILY
Qty: 90 TABLET | Refills: 1 | Status: SHIPPED | OUTPATIENT
Start: 2024-03-15 | End: 2024-06-13

## 2024-03-15 RX ORDER — ALBUTEROL SULFATE 90 UG/1
2 AEROSOL, METERED RESPIRATORY (INHALATION) EVERY 6 HOURS PRN
Qty: 18 G | Refills: 5 | Status: SHIPPED | OUTPATIENT
Start: 2024-03-15

## 2024-03-15 RX ORDER — FLUTICASONE PROPIONATE AND SALMETEROL 250; 50 UG/1; UG/1
1 POWDER RESPIRATORY (INHALATION) EVERY 12 HOURS
Qty: 60 EACH | Refills: 5 | Status: CANCELLED | OUTPATIENT
Start: 2024-03-15

## 2024-03-15 ASSESSMENT — ENCOUNTER SYMPTOMS
ABDOMINAL PAIN: 0
VOMITING: 0
CONSTIPATION: 0
EYES NEGATIVE: 1
ALLERGIC/IMMUNOLOGIC NEGATIVE: 1
NAUSEA: 0
CHEST TIGHTNESS: 0
COUGH: 1
VISUAL CHANGE: 0
SHORTNESS OF BREATH: 0
DIARRHEA: 0

## 2024-03-15 ASSESSMENT — COPD QUESTIONNAIRES: COPD: 1

## 2024-03-15 NOTE — ASSESSMENT & PLAN NOTE
GERD  Chronic. Stable/Well-Controlled  Labs: Mg, Vit B12 with annual labs. Recent labs reviewed.  Medication: Given Refill Rx for Omeprazole  Medication and dosage is unchanged today.  Discussed warning S&S r/t medication usage. Discussed SE, AR, AE.  Tolerating medication well. No SE, AR, AE. Benefits outweigh risks. Prefers to continue medication as currently prescribed.  Encourage appropriate rest and fluid intake  Encourage lifestyle changes, including healthy eating, exercise, limiting alcohol/tobacco use, and stress reducers.  F/u in 6 months for re-evaluation, unless an earlier f/u is required. If improvement is noted, we will provide additional refills.    General Measures  Elevate HOB  Avoid meals 2 to 3 hours before bedtime  Avoid stooping, bending, and tight-fitting garments  Avoid medications that relax LES (anticholinergic drugs; CCB)  Promoted weight loss  Avoid tobacco use and alcohol  Limit consumption of patient-specific food triggers  Track symptoms over time

## 2024-03-15 NOTE — ASSESSMENT & PLAN NOTE
CSA and UDS collected today  No referral needed at this time  Given refill for Ambien    INSOMNIA  Chronic. Stable/Well-Controlled  Labs:  Recent labs reviewed.  Diagnostic Testing: May consider sleep study (if concerned about sleep apnea or periodic limb movement disorder)  Medication:  Given Rx for Ambien  Medication and dosage is unchanged today.  Discussed warning S&S r/t medication usage. Discussed SE, AR, AE.  Contraindications/precautions are as follows for Nonbenzodiazepine hypnotics or Benzodiazepine hypnotics:  Not indicated for long-term treatment due to risks of tolerance, dependency, daytime attention and concentration compromise, incoordination, rebound insomnia  Long-acting benzodiazepines associated with higher incidence of daytime sedation and motor impairment  Avoid in elderly, pregnant, breastfeeding, substance abusers, and patients with suicidal or parasuicidal behaviors.  Avoid in patients with untreated obstructive apnea and chronic pulmonary disease.  Nonbenzodiazepine receptor agonists may occasionally induce parasomnias (sleepwalking, sleep eating, sleep driving).  Encourage appropriate rest and fluid intake  Encourage lifestyle changes, including healthy eating, exercise, limiting alcohol/tobacco use, and stress reducers.  F/u in 3 months for re-evaluation, unless an earlier f/u is required.     General Measures/Prevention:  Regular bed time and rise time  Having a consistent bedtime and rise time leads to more regular sleep schedules and avoids periods of sleep deprivation or periods of extended wakefulness during the night.  30-minute wind-down time before sleep.  If unable to sleep within 20 minutes, move to another environment and engage in quiet activity until sleepy.  Avoid napping.  Avoid napping, especially lasting longer than 1 hour and naps late in the day.  Limit caffeine.  Avoid caffeine after lunch. The time between lunch and bedtime represents approximately 2 half-lives for

## 2024-03-15 NOTE — ASSESSMENT & PLAN NOTE
Start metformin    DM Type II  Chronic. Stable/Well-Controlled  Labs: A1C (as indicated) with annual labs. Recent labs reviewed.  Medication:  Given Rx for Metformin  Medication and dosage is unchanged today.  Discussed warning S&S r/t medication usage. Discussed SE, AR, AE.  Tolerating medication well. No SE, AR, AE. Benefits outweigh risks. Prefers to continue medication as currently prescribed  Encourage appropriate rest and fluid intake  Encourage lifestyle changes, including healthy eating, exercise, limiting alcohol/tobacco use, and stress reducers.  Monitor BP and HR at home. Keep a log and bring to each visit.  Monitor BGL readings at home as directed (4x daily). Keep a log and bring to each visit.  F/u in 3 months for re-evaluation, unless an earlier f/u is required    General Measures:  Recommended diabetic foot exam at every visit.  Recommended annual diabetic eye exam  Monitor for control of cardiovascular risk factors including BP and lipids  Provided diabetes self-management education    Monofilament:Declined today    No components found for: \"HGBA1C\"   Lab Results   Component Value Date    LDLCALC 57 02/29/2024    CREATININE 0.70 02/29/2024       Diabetes (Patient Education)    Weight loss through dietary modification can improve many aspects of type 2 diabetes including glycemic control and hypertension. The improvement in glycemic control is related to both the degree caloric restriction and weight reduction. 150 minutes of intense aerobic exercise per week is shown to assist in diabetes management. Management of diabetes is good for long term health. Diabetes if shown to set one up for cardiac disease, high blood pressure, high cholesterol, and erectile dysfunction in men. Low fat diet is shown to assist in control. Patient advised to bring glucose log to next visit. Patient advised to monitor feet daily for wounds.    Utilize plate method for at least one meal each day. Fill 1/2 of your plate with

## 2024-03-15 NOTE — ASSESSMENT & PLAN NOTE
Hypothyroidism    Stable/Well-Controlled    Patient Education:  Stressed the importance of compliance with thyroid replacement therapy  Explained the need for lifelong treatment  Educated on interactions with other medications  Instructed to report any signs of infection or heart problems  Encouraged patient to report any new symptoms or a change in symptoms  Discussed the sign of thyrotoxicity. Discussed warning S&S  High-bulk diet may help avoid constipation  Encourage diet, exercise, lifestyle changes    Medication Usage:  Levothyroxine should be taken on an empty stomach, ideally an hour before breakfast  Administering at bedtime may result in higher levels than administering in the morning  Medications that interfere with its absorption should be taken 4 hours after the dose: ferrous sulfate, PPI, calcium carbonate, bile acid resins.  No change in dosage.  Take Levothyroxine 50 mcg PO QD    Labs:  Evaluated at least annually  Repeat if new or change in symptoms  Repeat if adjustment in medications (4 to 8 weeks)  Recent labs reviewed.    Lab Results   Component Value Date/Time    TSH 2.870 11/26/2021 09:05 AM    TSH 3.150 05/26/2021 10:36 AM    TSH 3.750 02/25/2021 08:53 AM      [place]

## 2024-03-15 NOTE — ASSESSMENT & PLAN NOTE
Stable.  Given refill for albuterol.  No refill needed for Advair.  Continue current treatment plan.

## 2024-03-15 NOTE — ASSESSMENT & PLAN NOTE
Stop atorvastatin and Vascepa  Start fenofibrate    Hyperlipidemia    Chronic. Worsening  Labs: at least annually. Recent labs reviewed.  Non-laboratory testing: None indicated today.  Medication:  Given Rx for fenofibrate  New medication started today.  Discussed warning S&S r/t medication usage. Discussed SE, AR, AE.  F/u in 3 months for re-evaluation, unless an earlier f/u is required. If improvement is noted, we will provide additional refills.    Diet/Exercise/Lifestyle Changes  Encourage appropriate rest and fluid intake  Encourage lifestyle changes, including healthy eating, exercise, limiting alcohol/tobacco use, and stress reducers.  Dietary Considerations  Eat more grains (substitute quinoa or brown rice instead of potatoes or pasta)  Eat more nuts (unsalted)  Eat more omega 3 fats (2 servings per week)  [place], mackerel, herring, tuna, flax, walnuts, spinach, broccoli, edamame, omega supplements  Eat more applies, pears, oranges, and elizabeth  Use olive oil when possible  Olive oil is not good for high heat applications like frying  Try using canola oil with high heat applications  Can be mixed with red wine vinegar, minced garlic, and ground pepper to make salad dressing  Drizzle 3 tbsp olive oil over vegetables (carrots, leeks). Scatter on some herbs, cover with foil. Bake at 375 for 45 minutes.  Try different foods  Avocado  Tofu  Activity Changes:  Exercise - try to get at least 15 minutes of dedicated activity every day  Eat smaller portions  Things to limit or take out of your diet:  Avoid soda, baked goods, candies, cookies as much as possible (these have high sugar content)  Avoid french fries, crackers, cakes, chips as much as possible (these have high trans fat content)  Limit eggs (4 per week)  Limit saturated fats (meat, dairy, eggs)

## 2024-03-15 NOTE — ASSESSMENT & PLAN NOTE
Worsening. See recommendation. Stop Atorvastatin and Vascepa. Start Fenofibrate. Repeat testing in 3 months

## 2024-03-15 NOTE — ASSESSMENT & PLAN NOTE
HTN  Chronic. Stable/Well-Controlled  Labs: at least annually. Recent labs reviewed.  Non-laboratory testing: ECG (as indicated, not performed today)  Medication:  Given refill for lisinopril-HCTZ.  Recommend avoiding taking over the daily recommended dosing of HCTZ.  Medication and dosage is unchanged today.  Discussed warning S&S r/t medication usage. Discussed SE, AR, AE.  Tolerating medication well. No SE, AR, AE. Benefits outweigh risks. Prefers to continue medication as currently prescribed.  Encourage appropriate rest and fluid intake  Encourage lifestyle changes, including healthy eating, exercise, limiting alcohol/tobacco use, and stress reducers. DASH diet. Reduce sodium intake. Limit alcohol consumption to < 1 oz/day.  Monitor BP and HR at home. Keep a log and bring to each visit.  F/u in 3 months for re-evaluation, unless an earlier f/u is required. If improvement is noted, we will provide additional refills.

## 2024-03-15 NOTE — PROGRESS NOTES
Tobacco abuse disorder    Screening mammogram for breast cancer    Type 2 diabetes mellitus without complication, without long-term current use of insulin (HCC)       Family History   Problem Relation Age of Onset    Heart Attack Father     Diabetes Father     Hearing Loss Father     High Cholesterol Father     Cancer Mother         Lung CA    Diabetes Mother     Rheum Arthritis Mother     Cancer Maternal Grandmother     Stroke Paternal Grandmother         Past Surgical History:   Procedure Laterality Date    APPENDECTOMY       SECTION      X2    COLONOSCOPY      HERNIA REPAIR  10/2019    umbilical    HYSTERECTOMY (CERVIX STATUS UNKNOWN)      AGE 37?    HYSTERECTOMY, TOTAL ABDOMINAL (CERVIX REMOVED)          Review of Systems   Constitutional:  Positive for fatigue. Negative for activity change, appetite change, diaphoresis and fever.   HENT: Negative.     Eyes: Negative.    Respiratory:  Positive for cough. Negative for chest tightness and shortness of breath.    Cardiovascular:  Negative for chest pain and palpitations.   Gastrointestinal:  Negative for abdominal pain, constipation, diarrhea, nausea and vomiting.   Endocrine: Negative.    Genitourinary: Negative.  Negative for menstrual problem.   Musculoskeletal: Negative.    Allergic/Immunologic: Negative.    Neurological:  Negative for dizziness, numbness and headaches.   Hematological: Negative.    Psychiatric/Behavioral:  The patient has insomnia.    All other systems reviewed and are negative.       Vitals:    03/15/24 0813   BP: 124/64   Pulse: 80   Temp: 97.2 °F (36.2 °C)   SpO2: 96%        Physical Exam  Vitals and nursing note reviewed.   Constitutional:       General: She is not in acute distress.     Appearance: Normal appearance. She is not ill-appearing or toxic-appearing.   HENT:      Head: Normocephalic.      Right Ear: External ear normal.      Left Ear: External ear normal.      Nose: Nose normal.      Mouth/Throat:      Mouth: Mucous

## 2024-03-23 LAB
AMPHETAMINES UR QL SCN: NEGATIVE NG/ML
BARBITURATES UR QL SCN: NEGATIVE NG/ML
BENZODIAZ UR QL SCN: NEGATIVE NG/ML
BZE UR QL SCN: NEGATIVE NG/ML
CANNABINOIDS UR QL CFM: 20 NG/ML
CANNABINOIDS UR QL SCN: NORMAL NG/ML
CANNABINOIDS, URINE: POSITIVE
CODEINE URINE: ABNORMAL
CREAT UR-MCNC: 105.2 MG/DL (ref 20–300)
FENTANYL+NORFENTANYL UR QL SCN: NEGATIVE PG/ML
HYDROCODONE, URINE CONFIRMATION: 1198 NG/ML
HYDROCODONE, URINE: POSITIVE
HYDROMORPHONE, URINE CONFIRMATION: 314 NG/ML
HYDROMORPHONE, URINE: POSITIVE
LABORATORY COMMENT REPORT: NORMAL
MEPERIDINE UR QL: NEGATIVE NG/ML
METHADONE UR QL SCN: NEGATIVE NG/ML
MORPHINE, OPI1M: NEGATIVE
OPIATES UR QL SCN: NORMAL NG/ML
OPIATES, URINE: ABNORMAL NG/ML
OXYCODONE+OXYMORPHONE UR QL SCN: NEGATIVE NG/ML
PCP UR QL: NEGATIVE NG/ML
PH UR: 5.6 (ref 4.5–8.9)
PROPOXYPH UR QL SCN: NEGATIVE NG/ML
SP GR UR: 1.03
TRAMADOL UR QL SCN: NEGATIVE NG/ML

## 2024-03-25 DIAGNOSIS — F51.01 PRIMARY INSOMNIA: Primary | ICD-10-CM

## 2024-03-25 RX ORDER — RAMELTEON 8 MG/1
8 TABLET ORAL NIGHTLY PRN
Qty: 90 TABLET | Refills: 1 | Status: SHIPPED | OUTPATIENT
Start: 2024-03-25 | End: 2024-06-23

## 2024-04-10 LAB
HEMOCCULT STL QL: NEGATIVE
VALID INTERNAL CONTROL: YES

## 2024-05-04 DIAGNOSIS — F51.01 PRIMARY INSOMNIA: ICD-10-CM

## 2024-05-06 RX ORDER — RAMELTEON 8 MG/1
8 TABLET ORAL DAILY PRN
Qty: 90 TABLET | Refills: 2 | OUTPATIENT
Start: 2024-05-06

## 2024-06-14 ENCOUNTER — NURSE ONLY (OUTPATIENT)
Dept: PRIMARY CARE CLINIC | Facility: CLINIC | Age: 54
End: 2024-06-14

## 2024-06-14 DIAGNOSIS — R79.89 OTHER SPECIFIED ABNORMAL FINDINGS OF BLOOD CHEMISTRY: ICD-10-CM

## 2024-06-14 DIAGNOSIS — Z13.220 SCREENING FOR LIPID DISORDERS: ICD-10-CM

## 2024-06-14 DIAGNOSIS — R79.9 ABNORMAL BLOOD CHEMISTRY: ICD-10-CM

## 2024-06-14 DIAGNOSIS — Z13.228 SCREENING FOR ENDOCRINE, NUTRITIONAL, METABOLIC AND IMMUNITY DISORDER: ICD-10-CM

## 2024-06-14 DIAGNOSIS — Z13.0 SCREENING FOR ENDOCRINE, NUTRITIONAL, METABOLIC AND IMMUNITY DISORDER: ICD-10-CM

## 2024-06-14 DIAGNOSIS — Z13.21 ENCOUNTER FOR VITAMIN DEFICIENCY SCREENING: ICD-10-CM

## 2024-06-14 DIAGNOSIS — R73.09 OTHER ABNORMAL GLUCOSE: ICD-10-CM

## 2024-06-14 DIAGNOSIS — Z13.1 SCREENING FOR DIABETES MELLITUS: ICD-10-CM

## 2024-06-14 DIAGNOSIS — Z13.29 SCREENING FOR THYROID DISORDER: ICD-10-CM

## 2024-06-14 DIAGNOSIS — Z13.0 SCREENING FOR DEFICIENCY ANEMIA: ICD-10-CM

## 2024-06-14 DIAGNOSIS — R53.83 OTHER FATIGUE: ICD-10-CM

## 2024-06-14 DIAGNOSIS — R53.81 MALAISE: ICD-10-CM

## 2024-06-14 DIAGNOSIS — R53.83 MALAISE AND FATIGUE: ICD-10-CM

## 2024-06-14 DIAGNOSIS — Z79.899 ENCOUNTER FOR LONG-TERM (CURRENT) USE OF MEDICATIONS: ICD-10-CM

## 2024-06-14 DIAGNOSIS — Z13.29 SCREENING FOR ENDOCRINE, METABOLIC AND IMMUNITY DISORDER: ICD-10-CM

## 2024-06-14 DIAGNOSIS — E55.9 VITAMIN D DEFICIENCY: ICD-10-CM

## 2024-06-14 DIAGNOSIS — R53.82 CHRONIC FATIGUE: ICD-10-CM

## 2024-06-14 DIAGNOSIS — Z13.0 SCREENING FOR ENDOCRINE, METABOLIC AND IMMUNITY DISORDER: ICD-10-CM

## 2024-06-14 DIAGNOSIS — Z13.228 SCREENING FOR METABOLIC DISORDER: ICD-10-CM

## 2024-06-14 DIAGNOSIS — Z13.228 SCREENING FOR ENDOCRINE, METABOLIC AND IMMUNITY DISORDER: ICD-10-CM

## 2024-06-14 DIAGNOSIS — Z13.29 SCREENING FOR ENDOCRINE, NUTRITIONAL, METABOLIC AND IMMUNITY DISORDER: ICD-10-CM

## 2024-06-14 DIAGNOSIS — R53.81 MALAISE AND FATIGUE: ICD-10-CM

## 2024-06-14 DIAGNOSIS — Z13.21 SCREENING FOR ENDOCRINE, NUTRITIONAL, METABOLIC AND IMMUNITY DISORDER: ICD-10-CM

## 2024-06-14 LAB
25(OH)D3 SERPL-MCNC: 13.1 NG/ML (ref 30–100)
ALBUMIN SERPL-MCNC: 3.8 G/DL (ref 3.5–5)
ALBUMIN/GLOB SERPL: 1.3 (ref 1–1.9)
ALP SERPL-CCNC: 80 U/L (ref 35–104)
ALT SERPL-CCNC: 14 U/L (ref 12–65)
ANION GAP SERPL CALC-SCNC: 10 MMOL/L (ref 9–18)
AST SERPL-CCNC: 21 U/L (ref 15–37)
BASOPHILS # BLD: 0.1 K/UL (ref 0–0.2)
BASOPHILS NFR BLD: 1 % (ref 0–2)
BILIRUB SERPL-MCNC: <0.2 MG/DL (ref 0–1.2)
BUN SERPL-MCNC: 18 MG/DL (ref 6–23)
CALCIUM SERPL-MCNC: 9.2 MG/DL (ref 8.8–10.2)
CHLORIDE SERPL-SCNC: 103 MMOL/L (ref 98–107)
CHOLEST SERPL-MCNC: 267 MG/DL (ref 0–200)
CO2 SERPL-SCNC: 25 MMOL/L (ref 20–28)
CREAT SERPL-MCNC: 0.77 MG/DL (ref 0.6–1.1)
DIFFERENTIAL METHOD BLD: ABNORMAL
EOSINOPHIL # BLD: 0 K/UL (ref 0–0.8)
EOSINOPHIL NFR BLD: 0 % (ref 0.5–7.8)
ERYTHROCYTE [DISTWIDTH] IN BLOOD BY AUTOMATED COUNT: 13.5 % (ref 11.9–14.6)
EST. AVERAGE GLUCOSE BLD GHB EST-MCNC: 143 MG/DL
GLOBULIN SER CALC-MCNC: 2.8 G/DL (ref 2.3–3.5)
GLUCOSE SERPL-MCNC: 123 MG/DL (ref 70–99)
HBA1C MFR BLD: 6.6 % (ref 0–5.6)
HCT VFR BLD AUTO: 47.6 % (ref 35.8–46.3)
HDLC SERPL-MCNC: 35 MG/DL (ref 40–60)
HDLC SERPL: 7.6 (ref 0–5)
HGB BLD-MCNC: 14.7 G/DL (ref 11.7–15.4)
IMM GRANULOCYTES # BLD AUTO: 0 K/UL (ref 0–0.5)
IMM GRANULOCYTES NFR BLD AUTO: 1 % (ref 0–5)
LDLC SERPL CALC-MCNC: ABNORMAL MG/DL (ref 0–100)
LDLC SERPL DIRECT ASSAY-MCNC: 147 MG/DL (ref 0–100)
LYMPHOCYTES # BLD: 2.3 K/UL (ref 0.5–4.6)
LYMPHOCYTES NFR BLD: 28 % (ref 13–44)
MCH RBC QN AUTO: 29.8 PG (ref 26.1–32.9)
MCHC RBC AUTO-ENTMCNC: 30.9 G/DL (ref 31.4–35)
MCV RBC AUTO: 96.6 FL (ref 82–102)
MONOCYTES # BLD: 0.4 K/UL (ref 0.1–1.3)
MONOCYTES NFR BLD: 5 % (ref 4–12)
NEUTS SEG # BLD: 5.2 K/UL (ref 1.7–8.2)
NEUTS SEG NFR BLD: 65 % (ref 43–78)
NRBC # BLD: 0 K/UL (ref 0–0.2)
PLATELET # BLD AUTO: 193 K/UL (ref 150–450)
PMV BLD AUTO: 11.2 FL (ref 9.4–12.3)
POTASSIUM SERPL-SCNC: 4.6 MMOL/L (ref 3.5–5.1)
PROT SERPL-MCNC: 6.7 G/DL (ref 6.3–8.2)
RBC # BLD AUTO: 4.93 M/UL (ref 4.05–5.2)
SODIUM SERPL-SCNC: 137 MMOL/L (ref 136–145)
TRIGL SERPL-MCNC: 480 MG/DL (ref 0–150)
TSH W FREE THYROID IF ABNORMAL: 3.26 UIU/ML (ref 0.27–4.2)
VIT B12 SERPL-MCNC: 376 PG/ML (ref 193–986)
VLDLC SERPL CALC-MCNC: 96 MG/DL (ref 6–23)
WBC # BLD AUTO: 8 K/UL (ref 4.3–11.1)

## 2024-06-17 ENCOUNTER — TELEMEDICINE (OUTPATIENT)
Dept: PRIMARY CARE CLINIC | Facility: CLINIC | Age: 54
End: 2024-06-17
Payer: MEDICARE

## 2024-06-17 DIAGNOSIS — M54.50 CHRONIC BILATERAL LOW BACK PAIN, UNSPECIFIED WHETHER SCIATICA PRESENT: ICD-10-CM

## 2024-06-17 DIAGNOSIS — E66.01 SEVERE OBESITY (HCC): Primary | ICD-10-CM

## 2024-06-17 DIAGNOSIS — E06.3 HYPOTHYROIDISM DUE TO HASHIMOTO'S THYROIDITIS: ICD-10-CM

## 2024-06-17 DIAGNOSIS — F51.01 PRIMARY INSOMNIA: ICD-10-CM

## 2024-06-17 DIAGNOSIS — E78.2 MIXED HYPERLIPIDEMIA: ICD-10-CM

## 2024-06-17 DIAGNOSIS — E78.2 ELEVATED TRIGLYCERIDES WITH HIGH CHOLESTEROL: ICD-10-CM

## 2024-06-17 DIAGNOSIS — J41.1 MUCOPURULENT CHRONIC BRONCHITIS (HCC): ICD-10-CM

## 2024-06-17 DIAGNOSIS — G89.29 CHRONIC BILATERAL LOW BACK PAIN, UNSPECIFIED WHETHER SCIATICA PRESENT: ICD-10-CM

## 2024-06-17 DIAGNOSIS — K21.9 GASTROESOPHAGEAL REFLUX DISEASE WITHOUT ESOPHAGITIS: ICD-10-CM

## 2024-06-17 DIAGNOSIS — E03.8 HYPOTHYROIDISM DUE TO HASHIMOTO'S THYROIDITIS: ICD-10-CM

## 2024-06-17 DIAGNOSIS — E55.9 VITAMIN D DEFICIENCY: ICD-10-CM

## 2024-06-17 DIAGNOSIS — E11.9 TYPE 2 DIABETES MELLITUS WITHOUT COMPLICATION, WITHOUT LONG-TERM CURRENT USE OF INSULIN (HCC): ICD-10-CM

## 2024-06-17 DIAGNOSIS — I10 ESSENTIAL HYPERTENSION: ICD-10-CM

## 2024-06-17 DIAGNOSIS — M79.7 FIBROMYALGIA: ICD-10-CM

## 2024-06-17 PROBLEM — Z09 S/P UMBILICAL HERNIA REPAIR, FOLLOW-UP EXAM: Status: RESOLVED | Noted: 2019-09-23 | Resolved: 2024-06-17

## 2024-06-17 PROBLEM — Z71.6 ENCOUNTER FOR SMOKING CESSATION COUNSELING: Status: RESOLVED | Noted: 2020-03-04 | Resolved: 2024-06-17

## 2024-06-17 PROBLEM — M77.11 LATERAL EPICONDYLITIS, RIGHT ELBOW: Status: RESOLVED | Noted: 2021-01-26 | Resolved: 2024-06-17

## 2024-06-17 PROBLEM — R21 RASH AND NONSPECIFIC SKIN ERUPTION: Status: RESOLVED | Noted: 2019-09-03 | Resolved: 2024-06-17

## 2024-06-17 PROBLEM — Z12.31 SCREENING MAMMOGRAM FOR BREAST CANCER: Status: RESOLVED | Noted: 2022-12-09 | Resolved: 2024-06-17

## 2024-06-17 PROCEDURE — 99213 OFFICE O/P EST LOW 20 MIN: CPT | Performed by: NURSE PRACTITIONER

## 2024-06-17 PROCEDURE — 3044F HG A1C LEVEL LT 7.0%: CPT | Performed by: NURSE PRACTITIONER

## 2024-06-17 RX ORDER — ERGOCALCIFEROL 1.25 MG/1
50000 CAPSULE ORAL WEEKLY
Qty: 12 CAPSULE | Refills: 1 | Status: SHIPPED | OUTPATIENT
Start: 2024-06-17

## 2024-06-17 RX ORDER — ICOSAPENT ETHYL 1 G/1
2 CAPSULE ORAL 2 TIMES DAILY
Qty: 360 CAPSULE | Refills: 1 | Status: SHIPPED | OUTPATIENT
Start: 2024-06-17 | End: 2024-12-14

## 2024-06-17 RX ORDER — DOXEPIN HYDROCHLORIDE 10 MG/1
10 CAPSULE ORAL NIGHTLY
Qty: 90 CAPSULE | Refills: 1 | Status: SHIPPED | OUTPATIENT
Start: 2024-06-17 | End: 2024-12-14

## 2024-06-17 ASSESSMENT — ENCOUNTER SYMPTOMS
NAUSEA: 0
ALLERGIC/IMMUNOLOGIC NEGATIVE: 1
CHEST TIGHTNESS: 0
DIARRHEA: 0
ABDOMINAL PAIN: 0
RESPIRATORY NEGATIVE: 1
CONSTIPATION: 0
EYES NEGATIVE: 1
SHORTNESS OF BREATH: 0
VOMITING: 0

## 2024-06-17 NOTE — ASSESSMENT & PLAN NOTE
Stable.  Chronic.  Does not see specialist  On albuterol inhaler as needed  On Advair 250-50 mcg daily

## 2024-06-17 NOTE — ASSESSMENT & PLAN NOTE
On omeprazole 40 mg daily    GERD  Chronic. Stable/Well-Controlled  Labs: Mg, Vit B12 with annual labs. Recent labs reviewed.  Medication:  See above  Medication and dosage is unchanged today.  Discussed warning S&S r/t medication usage. Discussed SE, AR, AE.  Tolerating medication well. No SE, AR, AE. Benefits outweigh risks. Prefers to continue medication as currently prescribed.  Encourage appropriate rest and fluid intake  Encourage lifestyle changes, including healthy eating, exercise, limiting alcohol/tobacco use, and stress reducers.  F/u in 6 months for re-evaluation, unless an earlier f/u is required. If improvement is noted, we will provide additional refills.    General Measures  Elevate HOB  Avoid meals 2 to 3 hours before bedtime  Avoid stooping, bending, and tight-fitting garments  Avoid medications that relax LES (anticholinergic drugs; CCB)  Promoted weight loss  Avoid tobacco use and alcohol  Limit consumption of patient-specific food triggers  Track symptoms over time

## 2024-06-17 NOTE — ASSESSMENT & PLAN NOTE
Referral to Sleep Medicine  Stop Ramelteon. Has tried Ambien in the past as well  Will try Doxepin 10 mg capsule    INSOMNIA  Chronic.  Stable  Labs:  Recent labs reviewed.  Diagnostic Testing: May consider sleep study (if concerned about sleep apnea or periodic limb movement disorder)  Medication:  See above  New medication started today.  Discussed warning S&S r/t medication usage. Discussed SE, AR, AE.  Contraindications/precautions are as follows for Nonbenzodiazepine hypnotics or Benzodiazepine hypnotics:  Not indicated for long-term treatment due to risks of tolerance, dependency, daytime attention and concentration compromise, incoordination, rebound insomnia  Long-acting benzodiazepines associated with higher incidence of daytime sedation and motor impairment  Avoid in elderly, pregnant, breastfeeding, substance abusers, and patients with suicidal or parasuicidal behaviors.  Avoid in patients with untreated obstructive apnea and chronic pulmonary disease.  Nonbenzodiazepine receptor agonists may occasionally induce parasomnias (sleepwalking, sleep eating, sleep driving).  Encourage appropriate rest and fluid intake  Encourage lifestyle changes, including healthy eating, exercise, limiting alcohol/tobacco use, and stress reducers.  F/u in 6 months for re-evaluation, unless an earlier f/u is required.     General Measures/Prevention:  Regular bed time and rise time  Having a consistent bedtime and rise time leads to more regular sleep schedules and avoids periods of sleep deprivation or periods of extended wakefulness during the night.  30-minute wind-down time before sleep.  If unable to sleep within 20 minutes, move to another environment and engage in quiet activity until sleepy.  Avoid napping.  Avoid napping, especially lasting longer than 1 hour and naps late in the day.  Limit caffeine.  Avoid caffeine after lunch. The time between lunch and bedtime represents approximately 2 half-lives for caffeine,

## 2024-06-17 NOTE — PROGRESS NOTES
mg/dL      VLDL Cholesterol Calculated 06/14/2024 96 (H)  6 - 23 MG/DL Final    Chol/HDL Ratio 06/14/2024 7.6 (H)  0.0 - 5.0   Final    LDL Direct 06/14/2024 147 (H)  0 - 100 mg/dl Final    Comment: Near Optimal: 100-129 mg/dL  Borderline High: 130-159, High: 160-189 mg/dL  Very High: Greater than or equal to 190 mg/dL         Educational documents were provided including; but, not limited to diagnosis, prognosis, recurrent, complications, monitoring, instructions, prevention, etc.    Patient aware of all medications (prescribed and recommended). Patient verbalized understanding. Patient declined all other medications (OTC, provided by clinic and prescribed) as well as additional testing/imaging/diagnostics at this time. Patient aware of risks associated with declining treatment/recommendations and/or non-compliance with plan of care.    *Side effects, adverse effects, risks versus benefits associated with medications prescribed/recommended were discussed with the patient. Patient verbalized understanding. All questions answered.    *Patient was encouraged to return to the clinic and/or PCP. Or seek emergent care if worsening signs and symptoms warrant immediate evaluation including, but not limited to HA, blurred vision, facial asymmetry, speech disturbance, difficulty with ambulation/gait, numbness, tingling, weakness, syncope, chest pain (with or without radiation), left arm pain, jaw pain, changes in hearing (loss), fever, unexplained sweating, malaise/fatigue, difficulty swallowing, mental changes (confusion, AMS), lightheadedness/dizziness, difficulty breathing, or shortness of breath.    I have reviewed the patient's medication list, past medical, family, social, and surgical history in detail and updated the patient record appropriately.    I have reviewed the patient's vital signs and discussed risks associated with any abnormal vital signs (during visit and following this visit) as well as appropriate

## 2024-06-17 NOTE — ASSESSMENT & PLAN NOTE
Start Vit D weekly, Rx sent to pharmacy    Vitamin D Deficiency    Low on recent labs    Patient Education:  Common risk factors for vitamin D deficiency include inadequate sun exposure or dietary intake; obesity; and black or  ethnicity and obesity.  Educated on interactions with other medications  Stressed the importance of compliance with supplemental therapy  Explained the need for lifelong treatment  Encouraged patient to report any new symptoms or a change in symptoms  Encourage proper diet, exercise, lifestyle changes  Medication Usage:  ?  Vitamin D sufficient (25-OH vitamin D 20 ng/ml)  Vitamin D 800 to 2,000 IU/day D-   (animal derived) may be slightly more effective than DI (plant derived), but clinical significance is uncertain.  ?  Calcium supplementation: unclear benefit and may increase some congenital heart disease risk in patients; no supplementation currently required  Vitamin D deficiency (25-OH vitamin D <20 ng/ml)  50,000 IU/week for 8 to 12 weeks, followed by 800 to 2,000 IU/day of vitamin DI    Labs:  Evaluated at least annually  Repeat if new or change in symptoms   Discussed labs today. No new labs indicated    Lab Results   Component Value Date/Time    VITD25 13.1 (L) 06/14/2024 11:51 AM

## 2024-06-17 NOTE — ASSESSMENT & PLAN NOTE
On lisinopril-HCTZ 10-12 0.5 daily  On HCTZ 25 mg daily    HTN  Chronic. Stable/Well-Controlled  Labs: at least annually. Recent labs reviewed.  Non-laboratory testing: ECG (as indicated, not performed today)  Medication:  See above  Medication and dosage is unchanged today.  Discussed warning S&S r/t medication usage. Discussed SE, AR, AE.  Tolerating medication well. No SE, AR, AE. Benefits outweigh risks. Prefers to continue medication as currently prescribed.  Encourage appropriate rest and fluid intake  Encourage lifestyle changes, including healthy eating, exercise, limiting alcohol/tobacco use, and stress reducers. DASH diet. Reduce sodium intake. Limit alcohol consumption to < 1 oz/day.  Monitor BP and HR at home. Keep a log and bring to each visit.  F/u in 6 months for re-evaluation, unless an earlier f/u is required. If improvement is noted, we will provide additional refills.

## 2024-06-17 NOTE — ASSESSMENT & PLAN NOTE
May stop fenofibrate  Re-start Vascepa 1 g - 2 caps BID  Re-start Atorvastatin 40 mg PO QD    Hyperlipidemia    Chronic. Worsening  Labs: at least annually. Recent labs reviewed.  Non-laboratory testing: None indicated today.  Medication:  See above  Discussed warning S&S r/t medication usage. Discussed SE, AR, AE.  F/u in 6 months for re-evaluation, unless an earlier f/u is required. If improvement is noted, we will provide additional refills.    Diet/Exercise/Lifestyle Changes  Encourage appropriate rest and fluid intake  Encourage lifestyle changes, including healthy eating, exercise, limiting alcohol/tobacco use, and stress reducers.  Dietary Considerations  Eat more grains (substitute quinoa or brown rice instead of potatoes or pasta)  Eat more nuts (unsalted)  Eat more omega 3 fats (2 servings per week)  [place], mackerel, herring, tuna, flax, walnuts, spinach, broccoli, edamame, omega supplements  Eat more applies, pears, oranges, and elizabeth  Use olive oil when possible  Olive oil is not good for high heat applications like frying  Try using canola oil with high heat applications  Can be mixed with red wine vinegar, minced garlic, and ground pepper to make salad dressing  Drizzle 3 tbsp olive oil over vegetables (carrots, leeks). Scatter on some herbs, cover with foil. Bake at 375 for 45 minutes.  Try different foods  Avocado  Tofu  Activity Changes:  Exercise - try to get at least 15 minutes of dedicated activity every day  Eat smaller portions  Things to limit or take out of your diet:  Avoid soda, baked goods, candies, cookies as much as possible (these have high sugar content)  Avoid french fries, crackers, cakes, chips as much as possible (these have high trans fat content)  Limit eggs (4 per week)  Limit saturated fats (meat, dairy, eggs)

## 2024-06-17 NOTE — ASSESSMENT & PLAN NOTE
On levothyroxine 50 mcg daily    Hypothyroidism    Stable/Well-Controlled  Denies symptoms    Patient Education:  Stressed the importance of compliance with thyroid replacement therapy  Explained the need for lifelong treatment  Educated on interactions with other medications  Instructed to report any signs of infection or heart problems  Encouraged patient to report any new symptoms or a change in symptoms  Discussed the sign of thyrotoxicity. Discussed warning S&S  High-bulk diet may help avoid constipation  Encourage diet, exercise, lifestyle changes    Medication Usage:  Levothyroxine should be taken on an empty stomach, ideally an hour before breakfast  Administering at bedtime may result in higher levels than administering in the morning  Medications that interfere with its absorption should be taken 4 hours after the dose: ferrous sulfate, PPI, calcium carbonate, bile acid resins.  No change in dosage.  Continue current medication.    Labs:  Evaluated at least annually  Repeat if new or change in symptoms  Repeat if adjustment in medications (4 to 8 weeks)  Recent labs reviewed.    Lab Results   Component Value Date/Time    TSH 2.870 11/26/2021 09:05 AM    TSH 3.150 05/26/2021 10:36 AM    TSH 3.750 02/25/2021 08:53 AM

## 2024-06-17 NOTE — ASSESSMENT & PLAN NOTE
On metformin 500 mg p.o. daily.  Recently started 03/2024    DM Type II  Chronic.  Elevated on recent labs  Labs: A1C (as indicated) with annual labs. Recent labs reviewed.  Medication:  See above  Medication and dosage is unchanged today.  Discussed warning S&S r/t medication usage. Discussed SE, AR, AE.  Tolerating medication well. No SE, AR, AE. Benefits outweigh risks. Prefers to continue medication as currently prescribed  Encourage appropriate rest and fluid intake  Encourage lifestyle changes, including healthy eating, exercise, limiting alcohol/tobacco use, and stress reducers.  Monitor BP and HR at home. Keep a log and bring to each visit.  Monitor BGL readings at home as directed (4x daily). Keep a log and bring to each visit.  F/u in 6 months for re-evaluation, unless an earlier f/u is required    General Measures:  Recommended diabetic foot exam at every visit.  Recommended annual diabetic eye exam  Monitor for control of cardiovascular risk factors including BP and lipids  Provided diabetes self-management education    Monofilament:Declined today    No components found for: \"HGBA1C\"   Lab Results   Component Value Date    CREATININE 0.77 06/14/2024       Diabetes (Patient Education)    Weight loss through dietary modification can improve many aspects of type 2 diabetes including glycemic control and hypertension. The improvement in glycemic control is related to both the degree caloric restriction and weight reduction. 150 minutes of intense aerobic exercise per week is shown to assist in diabetes management. Management of diabetes is good for long term health. Diabetes if shown to set one up for cardiac disease, high blood pressure, high cholesterol, and erectile dysfunction in men. Low fat diet is shown to assist in control. Patient advised to bring glucose log to next visit. Patient advised to monitor feet daily for wounds.    Utilize plate method for at least one meal each day. Fill 1/2 of your

## 2024-08-21 ASSESSMENT — PATIENT HEALTH QUESTIONNAIRE - PHQ9
SUM OF ALL RESPONSES TO PHQ QUESTIONS 1-9: 0
SUM OF ALL RESPONSES TO PHQ QUESTIONS 1-9: 0
2. FEELING DOWN, DEPRESSED OR HOPELESS: NOT AT ALL
1. LITTLE INTEREST OR PLEASURE IN DOING THINGS: NOT AT ALL
SUM OF ALL RESPONSES TO PHQ QUESTIONS 1-9: 0
1. LITTLE INTEREST OR PLEASURE IN DOING THINGS: NOT AT ALL
2. FEELING DOWN, DEPRESSED OR HOPELESS: NOT AT ALL
SUM OF ALL RESPONSES TO PHQ9 QUESTIONS 1 & 2: 0
SUM OF ALL RESPONSES TO PHQ QUESTIONS 1-9: 0
SUM OF ALL RESPONSES TO PHQ9 QUESTIONS 1 & 2: 0
SUM OF ALL RESPONSES TO PHQ QUESTIONS 1-9: 0
SUM OF ALL RESPONSES TO PHQ QUESTIONS 1-9: 0

## 2024-08-21 ASSESSMENT — LIFESTYLE VARIABLES
HOW MANY STANDARD DRINKS CONTAINING ALCOHOL DO YOU HAVE ON A TYPICAL DAY: PATIENT DOES NOT DRINK
HOW OFTEN DO YOU HAVE A DRINK CONTAINING ALCOHOL: NEVER

## 2024-08-22 ENCOUNTER — TELEMEDICINE (OUTPATIENT)
Dept: PRIMARY CARE CLINIC | Facility: CLINIC | Age: 54
End: 2024-08-22

## 2024-08-22 DIAGNOSIS — Z87.891 PERSONAL HISTORY OF TOBACCO USE: ICD-10-CM

## 2024-08-22 DIAGNOSIS — Z00.00 MEDICARE ANNUAL WELLNESS VISIT, SUBSEQUENT: Primary | ICD-10-CM

## 2024-08-22 NOTE — PROGRESS NOTES
Medicare Annual Wellness Visit    Micah Hopper is here for Medicare AWV (Pt presents for medicare wellness. )    Assessment & Plan   Medicare annual wellness visit, subsequent  Personal history of tobacco use  -     CO VISIT TO DISCUSS LUNG CA SCREEN W LDCT  -     CT Lung Screen (Initial/Annual/Baseline); Future  Recommendations for Preventive Services Due: see orders and patient instructions/AVS.  Recommended screening schedule for the next 5-10 years is provided to the patient in written form: see Patient Instructions/AVS.     No follow-ups on file.     Subjective   The following acute and/or chronic problems were also addressed today:    Patient's complete Health Risk Assessment and screening values have been reviewed and are found in Flowsheets. The following problems were reviewed today and where indicated follow up appointments were made and/or referrals ordered.    Positive Risk Factor Screenings with Interventions:               General HRA Questions:  Select all that apply: (!) New or Increased Pain (chronic back pain)  Interventions - Pain:  Patient advised to follow up in the office for further evaluation and treatment        Abnormal BMI (obese):  There is no height or weight on file to calculate BMI. (!) Abnormal  Interventions:  Patient advised to follow-up in this office for further evaluation and treatment         Hearing Screen:  Do you or your family notice any trouble with your hearing that hasn't been managed with hearing aids?: (!) Yes    Interventions:  See AVS for additional education material     Safety:  Do you have non-slip mats or non-slip surfaces or shower bars or grab bars in your shower or bathtub?: (!) No  Interventions:  See AVS for additional education material    ADL's:   Patient reports needing help with:  Select all that apply: (!) Housekeeping  Interventions:  See AVS for additional education material    Advanced Directives:  Do you have a Living Will?: (!)

## 2024-08-22 NOTE — PATIENT INSTRUCTIONS
protection around loud noises.  Wear a hearing aid as directed.  A professional can help you pick a hearing aid that will work best for you.  You can also get hearing aids over the counter for mild to moderate hearing loss.  Have hearing tests as your doctor suggests. They can show whether your hearing has changed. Your hearing aid may need to be adjusted.  Use other devices as needed. These may include:  Telephone amplifiers and hearing aids that can connect to a television, stereo, radio, or microphone.  Devices that use lights or vibrations. These alert you to the doorbell, a ringing telephone, or a baby monitor.  Television closed-captioning. This shows the words at the bottom of the screen. Most new TVs can do this.  TTY (text telephone). This lets you type messages back and forth on the telephone instead of talking or listening. These devices are also called TDD. When messages are typed on the keyboard, they are sent over the phone line to a receiving TTY. The message is shown on a monitor.  Use text messaging, social media, and email if it is hard for you to communicate by telephone.  Try to learn a listening technique called speechreading. It is not lipreading. You pay attention to people's gestures, expressions, posture, and tone of voice. These clues can help you understand what a person is saying. Face the person you are talking to, and have them face you. Make sure the lighting is good. You need to see the other person's face clearly.  Think about counseling if you need help to adjust to your hearing loss.  When should you call for help?  Watch closely for changes in your health, and be sure to contact your doctor if:    You think your hearing is getting worse.     You have new symptoms, such as dizziness or nausea.   Where can you learn more?  Go to https://www.healthwise.net/patientEd and enter R752 to learn more about \"Hearing Loss: Care Instructions.\"  Current as of: September 27, 2023  Content

## 2024-10-23 DIAGNOSIS — E11.9 TYPE 2 DIABETES MELLITUS WITHOUT COMPLICATION, WITHOUT LONG-TERM CURRENT USE OF INSULIN (HCC): ICD-10-CM

## 2024-10-23 NOTE — TELEPHONE ENCOUNTER
Pending   
Requesting refill of     Metf[employer]     [facility]  
ambulatory

## 2024-11-21 DIAGNOSIS — E78.2 ELEVATED TRIGLYCERIDES WITH HIGH CHOLESTEROL: ICD-10-CM

## 2024-11-21 DIAGNOSIS — E78.2 MIXED HYPERLIPIDEMIA: ICD-10-CM

## 2024-11-21 RX ORDER — ICOSAPENT ETHYL 1 G/1
2 CAPSULE ORAL 2 TIMES DAILY
Qty: 360 CAPSULE | Refills: 2 | Status: SHIPPED | OUTPATIENT
Start: 2024-11-21 | End: 2025-05-20

## 2024-12-09 ENCOUNTER — LAB (OUTPATIENT)
Dept: PRIMARY CARE CLINIC | Facility: CLINIC | Age: 54
End: 2024-12-09

## 2024-12-09 DIAGNOSIS — Z13.0 SCREENING FOR DEFICIENCY ANEMIA: ICD-10-CM

## 2024-12-09 DIAGNOSIS — R79.89 OTHER SPECIFIED ABNORMAL FINDINGS OF BLOOD CHEMISTRY: ICD-10-CM

## 2024-12-09 DIAGNOSIS — Z13.220 SCREENING FOR LIPID DISORDERS: ICD-10-CM

## 2024-12-09 DIAGNOSIS — R79.9 ABNORMAL BLOOD CHEMISTRY: ICD-10-CM

## 2024-12-09 DIAGNOSIS — R53.83 OTHER FATIGUE: ICD-10-CM

## 2024-12-09 DIAGNOSIS — Z13.228 SCREENING FOR METABOLIC DISORDER: ICD-10-CM

## 2024-12-09 DIAGNOSIS — Z13.1 SCREENING FOR DIABETES MELLITUS: ICD-10-CM

## 2024-12-09 DIAGNOSIS — Z13.0 SCREENING FOR ENDOCRINE, METABOLIC AND IMMUNITY DISORDER: ICD-10-CM

## 2024-12-09 DIAGNOSIS — R53.82 CHRONIC FATIGUE: ICD-10-CM

## 2024-12-09 DIAGNOSIS — Z13.21 SCREENING FOR ENDOCRINE, NUTRITIONAL, METABOLIC AND IMMUNITY DISORDER: ICD-10-CM

## 2024-12-09 DIAGNOSIS — R53.81 MALAISE AND FATIGUE: ICD-10-CM

## 2024-12-09 DIAGNOSIS — R53.83 MALAISE AND FATIGUE: ICD-10-CM

## 2024-12-09 DIAGNOSIS — Z13.1 SCREENING FOR DIABETES MELLITUS: Primary | ICD-10-CM

## 2024-12-09 DIAGNOSIS — Z13.21 ENCOUNTER FOR VITAMIN DEFICIENCY SCREENING: ICD-10-CM

## 2024-12-09 DIAGNOSIS — R73.09 OTHER ABNORMAL GLUCOSE: ICD-10-CM

## 2024-12-09 DIAGNOSIS — R53.81 MALAISE: ICD-10-CM

## 2024-12-09 DIAGNOSIS — E55.9 VITAMIN D DEFICIENCY: ICD-10-CM

## 2024-12-09 DIAGNOSIS — Z79.899 ENCOUNTER FOR LONG-TERM (CURRENT) USE OF MEDICATIONS: ICD-10-CM

## 2024-12-09 DIAGNOSIS — Z13.29 SCREENING FOR THYROID DISORDER: ICD-10-CM

## 2024-12-09 DIAGNOSIS — Z13.228 SCREENING FOR ENDOCRINE, NUTRITIONAL, METABOLIC AND IMMUNITY DISORDER: ICD-10-CM

## 2024-12-09 DIAGNOSIS — Z13.0 SCREENING FOR ENDOCRINE, NUTRITIONAL, METABOLIC AND IMMUNITY DISORDER: ICD-10-CM

## 2024-12-09 DIAGNOSIS — Z13.228 SCREENING FOR ENDOCRINE, METABOLIC AND IMMUNITY DISORDER: ICD-10-CM

## 2024-12-09 DIAGNOSIS — Z13.29 SCREENING FOR ENDOCRINE, METABOLIC AND IMMUNITY DISORDER: ICD-10-CM

## 2024-12-09 DIAGNOSIS — Z13.29 SCREENING FOR ENDOCRINE, NUTRITIONAL, METABOLIC AND IMMUNITY DISORDER: ICD-10-CM

## 2024-12-09 LAB
ALBUMIN SERPL-MCNC: 3.9 G/DL (ref 3.5–5)
ALBUMIN/GLOB SERPL: 1.3 (ref 1–1.9)
ALP SERPL-CCNC: 107 U/L (ref 35–104)
ALT SERPL-CCNC: 20 U/L (ref 8–45)
ANION GAP SERPL CALC-SCNC: 12 MMOL/L (ref 7–16)
AST SERPL-CCNC: 18 U/L (ref 15–37)
BASOPHILS # BLD: 0.1 K/UL (ref 0–0.2)
BASOPHILS NFR BLD: 1 % (ref 0–2)
BILIRUB SERPL-MCNC: 0.3 MG/DL (ref 0–1.2)
BUN SERPL-MCNC: 14 MG/DL (ref 6–23)
CALCIUM SERPL-MCNC: 9.1 MG/DL (ref 8.8–10.2)
CHLORIDE SERPL-SCNC: 99 MMOL/L (ref 98–107)
CO2 SERPL-SCNC: 26 MMOL/L (ref 20–29)
CREAT SERPL-MCNC: 0.73 MG/DL (ref 0.6–1.1)
CREAT UR-MCNC: 95.9 MG/DL (ref 28–217)
DIFFERENTIAL METHOD BLD: ABNORMAL
EOSINOPHIL # BLD: 0.4 K/UL (ref 0–0.8)
EOSINOPHIL NFR BLD: 4 % (ref 0.5–7.8)
ERYTHROCYTE [DISTWIDTH] IN BLOOD BY AUTOMATED COUNT: 13.8 % (ref 11.9–14.6)
EST. AVERAGE GLUCOSE BLD GHB EST-MCNC: 162 MG/DL
GLOBULIN SER CALC-MCNC: 2.9 G/DL (ref 2.3–3.5)
GLUCOSE SERPL-MCNC: 146 MG/DL (ref 70–99)
HBA1C MFR BLD: 7.3 % (ref 0–5.6)
HCT VFR BLD AUTO: 47.6 % (ref 35.8–46.3)
HGB BLD-MCNC: 15.2 G/DL (ref 11.7–15.4)
IMM GRANULOCYTES # BLD AUTO: 0.1 K/UL (ref 0–0.5)
IMM GRANULOCYTES NFR BLD AUTO: 1 % (ref 0–5)
LYMPHOCYTES # BLD: 2.3 K/UL (ref 0.5–4.6)
LYMPHOCYTES NFR BLD: 24 % (ref 13–44)
MCH RBC QN AUTO: 30.1 PG (ref 26.1–32.9)
MCHC RBC AUTO-ENTMCNC: 31.9 G/DL (ref 31.4–35)
MCV RBC AUTO: 94.3 FL (ref 82–102)
MICROALBUMIN UR-MCNC: <1.2 MG/DL (ref 0–20)
MICROALBUMIN/CREAT UR-RTO: NORMAL MG/G (ref 0–30)
MONOCYTES # BLD: 0.5 K/UL (ref 0.1–1.3)
MONOCYTES NFR BLD: 6 % (ref 4–12)
NEUTS SEG # BLD: 6.2 K/UL (ref 1.7–8.2)
NEUTS SEG NFR BLD: 64 % (ref 43–78)
NRBC # BLD: 0 K/UL (ref 0–0.2)
PLATELET # BLD AUTO: 191 K/UL (ref 150–450)
PMV BLD AUTO: 10.9 FL (ref 9.4–12.3)
POTASSIUM SERPL-SCNC: 4.6 MMOL/L (ref 3.5–5.1)
PROT SERPL-MCNC: 6.8 G/DL (ref 6.3–8.2)
RBC # BLD AUTO: 5.05 M/UL (ref 4.05–5.2)
SODIUM SERPL-SCNC: 137 MMOL/L (ref 136–145)
WBC # BLD AUTO: 9.6 K/UL (ref 4.3–11.1)

## 2024-12-10 LAB
25(OH)D3 SERPL-MCNC: 29.6 NG/ML (ref 30–100)
CHOLEST SERPL-MCNC: 185 MG/DL (ref 0–200)
HDLC SERPL-MCNC: 36 MG/DL (ref 40–60)
HDLC SERPL: 5.1 (ref 0–5)
LDLC SERPL CALC-MCNC: 79 MG/DL (ref 0–100)
T4 FREE SERPL-MCNC: 0.7 NG/DL (ref 0.9–1.7)
TRIGL SERPL-MCNC: 350 MG/DL (ref 0–150)
TSH W FREE THYROID IF ABNORMAL: 6.28 UIU/ML (ref 0.27–4.2)
VIT B12 SERPL-MCNC: 410 PG/ML (ref 193–986)
VLDLC SERPL CALC-MCNC: 70 MG/DL (ref 6–23)

## 2024-12-13 ENCOUNTER — OFFICE VISIT (OUTPATIENT)
Dept: PRIMARY CARE CLINIC | Facility: CLINIC | Age: 54
End: 2024-12-13

## 2024-12-13 VITALS
DIASTOLIC BLOOD PRESSURE: 89 MMHG | TEMPERATURE: 97.9 F | OXYGEN SATURATION: 98 % | SYSTOLIC BLOOD PRESSURE: 137 MMHG | HEIGHT: 62 IN | HEART RATE: 100 BPM | WEIGHT: 205 LBS | BODY MASS INDEX: 37.73 KG/M2

## 2024-12-13 DIAGNOSIS — Z12.31 BREAST CANCER SCREENING BY MAMMOGRAM: ICD-10-CM

## 2024-12-13 DIAGNOSIS — I10 ESSENTIAL HYPERTENSION: Primary | ICD-10-CM

## 2024-12-13 DIAGNOSIS — Z12.31 ENCOUNTER FOR SCREENING MAMMOGRAM FOR MALIGNANT NEOPLASM OF BREAST: ICD-10-CM

## 2024-12-13 DIAGNOSIS — K21.9 GASTROESOPHAGEAL REFLUX DISEASE WITHOUT ESOPHAGITIS: ICD-10-CM

## 2024-12-13 DIAGNOSIS — Z79.899 ENCOUNTER FOR LONG-TERM (CURRENT) USE OF HIGH-RISK MEDICATION: ICD-10-CM

## 2024-12-13 DIAGNOSIS — Z13.9 SCREENING FOR UNSPECIFIED CONDITION: ICD-10-CM

## 2024-12-13 DIAGNOSIS — E55.9 VITAMIN D DEFICIENCY: ICD-10-CM

## 2024-12-13 DIAGNOSIS — E06.3 HYPOTHYROIDISM DUE TO HASHIMOTO'S THYROIDITIS: ICD-10-CM

## 2024-12-13 DIAGNOSIS — E11.9 TYPE 2 DIABETES MELLITUS WITHOUT COMPLICATION, WITHOUT LONG-TERM CURRENT USE OF INSULIN (HCC): ICD-10-CM

## 2024-12-13 DIAGNOSIS — F51.01 PRIMARY INSOMNIA: ICD-10-CM

## 2024-12-13 DIAGNOSIS — Z51.81 ENCOUNTER FOR THERAPEUTIC DRUG MONITORING: ICD-10-CM

## 2024-12-13 DIAGNOSIS — Z02.83 ENCOUNTER FOR DRUG SCREENING: ICD-10-CM

## 2024-12-13 DIAGNOSIS — E78.2 MIXED HYPERLIPIDEMIA: ICD-10-CM

## 2024-12-13 DIAGNOSIS — E78.2 ELEVATED TRIGLYCERIDES WITH HIGH CHOLESTEROL: ICD-10-CM

## 2024-12-13 RX ORDER — ATORVASTATIN CALCIUM 40 MG/1
40 TABLET, FILM COATED ORAL DAILY
Qty: 90 TABLET | Refills: 1 | Status: SHIPPED | OUTPATIENT
Start: 2024-12-13

## 2024-12-13 RX ORDER — LISINOPRIL AND HYDROCHLOROTHIAZIDE 10; 12.5 MG/1; MG/1
1 TABLET ORAL DAILY
Qty: 90 TABLET | Refills: 1 | Status: SHIPPED | OUTPATIENT
Start: 2024-12-13 | End: 2025-06-11

## 2024-12-13 RX ORDER — LEVOTHYROXINE SODIUM 50 UG/1
50 TABLET ORAL DAILY
Qty: 90 TABLET | Refills: 1 | Status: SHIPPED | OUTPATIENT
Start: 2024-12-13

## 2024-12-13 RX ORDER — OMEPRAZOLE 40 MG/1
40 CAPSULE, DELAYED RELEASE ORAL DAILY
Qty: 90 CAPSULE | Refills: 1 | Status: SHIPPED | OUTPATIENT
Start: 2024-12-13

## 2024-12-13 RX ORDER — MIRTAZAPINE 7.5 MG/1
7.5 TABLET, FILM COATED ORAL NIGHTLY
Qty: 30 TABLET | Refills: 0 | Status: SHIPPED | OUTPATIENT
Start: 2024-12-13

## 2024-12-13 RX ORDER — ERGOCALCIFEROL 1.25 MG/1
50000 CAPSULE, LIQUID FILLED ORAL WEEKLY
Qty: 12 CAPSULE | Refills: 1 | Status: SHIPPED | OUTPATIENT
Start: 2024-12-13

## 2024-12-13 SDOH — ECONOMIC STABILITY: FOOD INSECURITY: WITHIN THE PAST 12 MONTHS, THE FOOD YOU BOUGHT JUST DIDN'T LAST AND YOU DIDN'T HAVE MONEY TO GET MORE.: NEVER TRUE

## 2024-12-13 SDOH — ECONOMIC STABILITY: FOOD INSECURITY: WITHIN THE PAST 12 MONTHS, YOU WORRIED THAT YOUR FOOD WOULD RUN OUT BEFORE YOU GOT MONEY TO BUY MORE.: NEVER TRUE

## 2024-12-13 SDOH — ECONOMIC STABILITY: INCOME INSECURITY: HOW HARD IS IT FOR YOU TO PAY FOR THE VERY BASICS LIKE FOOD, HOUSING, MEDICAL CARE, AND HEATING?: NOT HARD AT ALL

## 2024-12-13 ASSESSMENT — COPD QUESTIONNAIRES: COPD: 1

## 2024-12-13 ASSESSMENT — ENCOUNTER SYMPTOMS
CONSTIPATION: 0
NAUSEA: 0
ALLERGIC/IMMUNOLOGIC NEGATIVE: 1
RESPIRATORY NEGATIVE: 1
DIARRHEA: 0
ABDOMINAL PAIN: 0
SHORTNESS OF BREATH: 0
VOMITING: 0
EYES NEGATIVE: 1
CHEST TIGHTNESS: 0

## 2024-12-13 NOTE — PROGRESS NOTES
including, but not limited to HA, blurred vision, facial asymmetry, speech disturbance, difficulty with ambulation/gait, numbness, tingling, weakness, syncope, chest pain (with or without radiation), left arm pain, jaw pain, changes in hearing (loss), fever, unexplained sweating, malaise/fatigue, difficulty swallowing, mental changes (confusion, AMS), lightheadedness/dizziness, difficulty breathing, or shortness of breath.    I have reviewed the patient's medication list, past medical, family, social, and surgical history in detail and updated the patient record appropriately.    I have reviewed the patient's vital signs and discussed risks associated with any abnormal vital signs (during visit and following this visit) as well as appropriate parameters with the patient. Patient verbalized understanding. Patient agreed to seek emergent care if vital signs are above or below the parameters discussed.     Explanatory note: Be assured that the information provided to create your medical record comes from your provider. The written transcription portion of this note is prepared electronically by voice-recognition software. At times, there may be some errors in capitalization, punctuation, tense, or context that are inherent in the system, but your provider reviews the note for content and to ensure that the note contains appropriate information for your continuing care.

## 2025-01-04 DIAGNOSIS — F51.01 PRIMARY INSOMNIA: ICD-10-CM

## 2025-01-06 ENCOUNTER — HOSPITAL ENCOUNTER (OUTPATIENT)
Dept: MAMMOGRAPHY | Age: 55
Discharge: HOME | End: 2025-01-09
Payer: MEDICARE

## 2025-01-06 DIAGNOSIS — Z12.31 ENCOUNTER FOR SCREENING MAMMOGRAM FOR MALIGNANT NEOPLASM OF BREAST: ICD-10-CM

## 2025-01-06 PROCEDURE — 77063 BREAST TOMOSYNTHESIS BI: CPT

## 2025-01-06 RX ORDER — MIRTAZAPINE 7.5 MG/1
7.5 TABLET, FILM COATED ORAL NIGHTLY
Qty: 90 TABLET | Refills: 1 | Status: SHIPPED | OUTPATIENT
Start: 2025-01-06

## 2025-01-08 ENCOUNTER — HOSPITAL ENCOUNTER (OUTPATIENT)
Dept: CT IMAGING | Age: 55
Discharge: HOME | End: 2025-01-11
Payer: MEDICARE

## 2025-01-08 DIAGNOSIS — Z87.891 PERSONAL HISTORY OF TOBACCO USE: ICD-10-CM

## 2025-01-08 PROCEDURE — 71271 CT THORAX LUNG CANCER SCR C-: CPT

## 2025-01-08 NOTE — RESULT ENCOUNTER NOTE
Please let the patient know:    Mammogram:    Narrative: A bilateral digital mammogram and tomosynthesis were performed for routine screening. The patient is asymptomatic, with an estimated lifetime risk of breast cancer at 4.97% based on the Tyrer-Cuzick model, which places the patient in the low-risk category (<5%). The study includes comparison with prior exams from 12/27/2022 and 9/28/2020. Imaging reveals scattered fibroglandular density without any suspicious masses, calcifications, or distortions. No significant interval changes are noted.    Impression: The mammogram is stable with no evidence of malignancy. BI-RADS Category 1: Negative.    Recommendations:  · Routine annual screening mammogram in one year.  · For patients at higher risk, additional screening with breast MRI may be considered.  · Annual 3D tomosynthesis mammography is advised for patients with heterogeneously dense or extremely dense breast tissue or for those at intermediate risk.    [employer] message sent as well    Explanatory note: Be assured that the information provided to create your medical record comes from your provider. The written transcription portion of this note is prepared electronically by voice-recognition software. At times, there may be some errors in capitalization, punctuation, tense, or context that are inherent in the system, but your provider reviews the note for content and to ensure that the note contains appropriate information for your continuing care.

## 2025-01-10 NOTE — RESULT ENCOUNTER NOTE
Please let the patient know:    CT Lung Screening Results Summary:    Your CT lung screening did not show any suspicious lung masses or nodules. Here's a breakdown of the findings:    · Lungs: Evidence of mild emphysema and slight thickening of the bronchial walls, but no signs of concerning nodules, masses, or fluid around the lungs.    · Heart: Normal heart size with a trace amount of fluid around the heart (trace anterior pericardial effusion), which is likely not concerning but can be monitored if needed.    · Liver: Evidence of hepatic steatosis (fatty liver), which may benefit from lifestyle modifications.    · Other Findings: No enlarged lymph nodes or other abnormalities in the bones, thyroid, or chest structures.    Overall Assessment:  · Lung-RADS 1 (Negative): No concerning findings in the lungs. Continued annual lung screening is recommended.    If you have questions about specific findings, such as the fatty liver or trace fluid around the heart, we can discuss further steps or monitoring.    [employer] message sent as well    Explanatory note: Be assured that the information provided to create your medical record comes from your provider. The written transcription portion of this note is prepared electronically by voice-recognition software. At times, there may be some errors in capitalization, punctuation, tense, or context that are inherent in the system, but your provider reviews the note for content and to ensure that the note contains appropriate information for your continuing care.

## 2025-02-05 ENCOUNTER — TELEMEDICINE (OUTPATIENT)
Dept: PRIMARY CARE CLINIC | Facility: CLINIC | Age: 55
End: 2025-02-05

## 2025-02-05 DIAGNOSIS — E11.9 TYPE 2 DIABETES MELLITUS WITHOUT COMPLICATION, WITHOUT LONG-TERM CURRENT USE OF INSULIN (HCC): Primary | ICD-10-CM

## 2025-02-05 DIAGNOSIS — F51.01 PRIMARY INSOMNIA: ICD-10-CM

## 2025-02-05 RX ORDER — ZOLPIDEM TARTRATE 10 MG/1
10 TABLET ORAL NIGHTLY PRN
Qty: 30 TABLET | Refills: 2 | Status: SHIPPED | OUTPATIENT
Start: 2025-02-05 | End: 2025-08-04

## 2025-02-05 SDOH — ECONOMIC STABILITY: FOOD INSECURITY: WITHIN THE PAST 12 MONTHS, YOU WORRIED THAT YOUR FOOD WOULD RUN OUT BEFORE YOU GOT MONEY TO BUY MORE.: NEVER TRUE

## 2025-02-05 SDOH — ECONOMIC STABILITY: FOOD INSECURITY: WITHIN THE PAST 12 MONTHS, THE FOOD YOU BOUGHT JUST DIDN'T LAST AND YOU DIDN'T HAVE MONEY TO GET MORE.: NEVER TRUE

## 2025-02-05 ASSESSMENT — ENCOUNTER SYMPTOMS
EYES NEGATIVE: 1
NAUSEA: 0
CHEST TIGHTNESS: 0
CONSTIPATION: 0
ABDOMINAL PAIN: 0
VOMITING: 0
RESPIRATORY NEGATIVE: 1
DIARRHEA: 0
SHORTNESS OF BREATH: 0
ALLERGIC/IMMUNOLOGIC NEGATIVE: 1

## 2025-02-05 ASSESSMENT — PATIENT HEALTH QUESTIONNAIRE - PHQ9
SUM OF ALL RESPONSES TO PHQ QUESTIONS 1-9: 0
SUM OF ALL RESPONSES TO PHQ QUESTIONS 1-9: 0
2. FEELING DOWN, DEPRESSED OR HOPELESS: NOT AT ALL
SUM OF ALL RESPONSES TO PHQ9 QUESTIONS 1 & 2: 0
1. LITTLE INTEREST OR PLEASURE IN DOING THINGS: NOT AT ALL
SUM OF ALL RESPONSES TO PHQ QUESTIONS 1-9: 0
SUM OF ALL RESPONSES TO PHQ QUESTIONS 1-9: 0

## 2025-02-05 NOTE — PROGRESS NOTES
dysfunction in men. Low fat diet is shown to assist in control. Patient advised to bring glucose log to next visit. Patient advised to monitor feet daily for wounds.     Utilize plate method for at least one meal each day. Fill 1/2 of your plate with non-starchy fruits and vegetables such as lettuce spinach and broccoli. Fill 1/4 of your plate with starches, such as bread rice pasta cereal or potatoes. Fill 1/4 of your plate with protein such as beef, chicken, turkey, etc.     Try to start exercising even if it is as simple as walking 30 minutes 2-3 times per week. If you experience any kind of pain in your knees or hips try something with less resistant such as biking or swimming.     Glucometer with in office instruction available including appropriate times to check glucose, glucose control according to the American Diabetes Association, and appropriate disposal of used lancets.     Hypoglycemia       If you notice symptoms of low blood sugar, such as shaking, sweating, dizziness, or feeling weak, check your blood sugar. If it is less than 70 mg/dL, eat or drink 15 grams of carbohydrate (4 ounces of fruit juice or soda, 4 glucose tablets, or 5-6 hard candies). Wait 15 minutes and check blood sugar again. If still low, try another 15 grams and repeat until stable. Handout provided to patient.  2. Primary insomnia  Assessment & Plan:   Referral to Sleep Medicine previously  Has tried Ramelteon, Doxepin, and Remeron w/o success  Will restart Ambien 10 g p.o. today    UDS completed on 03/15/2024, positive for THC.  UDS completed on 12/13/2024, lab unable to locate.     CS Use  Chronic.  Stable..  CSA: UTD.  UDS: UTD.  Medication: See above - 90 day refill given  Discussed warning S&S r/t medication usage. Discussed SE, AR, AE.  F/u in 3 months for re-evaluation, unless an earlier f/u is required. If improvement is noted, we will provide additional refills.  Referral: Previously performed.    INSOMNIA    Chronic.

## 2025-02-05 NOTE — ASSESSMENT & PLAN NOTE
On metformin 500 mg p.o. daily.  Recently started 03/2024  On semaglutide 0.25 mg p.o. weekly     DM Type II    Chronic.  Elevated on recent labs  Labs: A1C (as indicated) with annual labs. Recent labs reviewed.  Medication:  See above  Medication and dosage is unchanged today.  Discussed warning S&S r/t medication usage. Discussed SE, AR, AE.  Tolerating medication well. No SE, AR, AE. Benefits outweigh risks. Prefers to continue medication as currently prescribed  Encourage appropriate rest and fluid intake  Encourage lifestyle changes, including healthy eating, exercise, limiting alcohol/tobacco use, and stress reducers.  Monitor BP and HR at home. Keep a log and bring to each visit.  Monitor BGL readings at home as directed (4x daily). Keep a log and bring to each visit.  F/u in 6 months for re-evaluation, unless an earlier f/u is required     General Measures:  Recommended diabetic foot exam at every visit.  Recommended annual diabetic eye exam  Monitor for control of cardiovascular risk factors including BP and lipids  Provided diabetes self-management education     Monofilament:Declined today     No components found for: \"HGBA1C\"         Lab Results   Component Value Date     CREATININE 0.77 06/14/2024         Diabetes (Patient Education)       Weight loss through dietary modification can improve many aspects of type 2 diabetes including glycemic control and hypertension. The improvement in glycemic control is related to both the degree caloric restriction and weight reduction. 150 minutes of intense aerobic exercise per week is shown to assist in diabetes management. Management of diabetes is good for long term health. Diabetes if shown to set one up for cardiac disease, high blood pressure, high cholesterol, and erectile dysfunction in men. Low fat diet is shown to assist in control. Patient advised to bring glucose log to next visit. Patient advised to monitor feet daily for wounds.     Utilize plate 
depression or anxiety)  Painful musculoskeletal conditions  Other medical or neurologic disorders  Obstructive sleep apnea  Restless leg syndrome or other sleep-related movement disorders  Drug or alcohol addiction/dependence  CNS hypersomnias (e.g., narcolepsy)     COMPLEMENTARY & ALTERNATIVE MEDICINE  Melatonin: decreases sleep latency when taken 30 to 120 minutes prior to bedtime, but there is no good evidence for efficacy in insomnia, and long-term effects are unknown (4)[B]  Valerian: Inconsistent evidence supporting efficacy and its slow onset of action (2 to 3 weeks) makes it unsuitable for the acute treatment of insomnia.  Cognitive-behavioral therapy (including relaxation therapy): effective and considered more useful than medications; recommended initial treatment for patients with chronic insomnia; no improvement of efficacy when combined with medication  Mindfulness awareness practices: improved sleep quality and sleep-related daytime impairment for older adults per small randomized trial  Behavioral interventions such as stimulus control therapy, sleep restriction, relaxation training may all be effective.     COMPLICATIONS  Daytime sleepiness, cognitive dysfunction  Pulmonary hypertension if chronic sleep apnea left untreated  Sleep apnea may lead to hypertension, stroke, or cardiac ischemia.

## 2025-04-28 ENCOUNTER — LAB (OUTPATIENT)
Dept: PRIMARY CARE CLINIC | Facility: CLINIC | Age: 55
End: 2025-04-28

## 2025-04-28 DIAGNOSIS — R53.83 OTHER FATIGUE: ICD-10-CM

## 2025-04-28 DIAGNOSIS — R53.83 MALAISE AND FATIGUE: ICD-10-CM

## 2025-04-28 DIAGNOSIS — R79.9 ABNORMAL BLOOD CHEMISTRY: ICD-10-CM

## 2025-04-28 DIAGNOSIS — Z13.29 SCREENING FOR ENDOCRINE, NUTRITIONAL, METABOLIC AND IMMUNITY DISORDER: ICD-10-CM

## 2025-04-28 DIAGNOSIS — Z13.0 SCREENING FOR DEFICIENCY ANEMIA: ICD-10-CM

## 2025-04-28 DIAGNOSIS — R53.81 MALAISE AND FATIGUE: ICD-10-CM

## 2025-04-28 DIAGNOSIS — Z13.29 SCREENING FOR THYROID DISORDER: ICD-10-CM

## 2025-04-28 DIAGNOSIS — Z13.21 SCREENING FOR ENDOCRINE, NUTRITIONAL, METABOLIC AND IMMUNITY DISORDER: ICD-10-CM

## 2025-04-28 DIAGNOSIS — Z13.220 SCREENING FOR LIPID DISORDERS: ICD-10-CM

## 2025-04-28 DIAGNOSIS — Z13.228 SCREENING FOR ENDOCRINE, NUTRITIONAL, METABOLIC AND IMMUNITY DISORDER: ICD-10-CM

## 2025-04-28 DIAGNOSIS — R53.82 CHRONIC FATIGUE: ICD-10-CM

## 2025-04-28 DIAGNOSIS — R53.81 MALAISE: ICD-10-CM

## 2025-04-28 DIAGNOSIS — Z13.21 ENCOUNTER FOR VITAMIN DEFICIENCY SCREENING: ICD-10-CM

## 2025-04-28 DIAGNOSIS — E55.9 VITAMIN D DEFICIENCY: ICD-10-CM

## 2025-04-28 DIAGNOSIS — Z13.228 SCREENING FOR METABOLIC DISORDER: ICD-10-CM

## 2025-04-28 DIAGNOSIS — Z13.0 SCREENING FOR ENDOCRINE, NUTRITIONAL, METABOLIC AND IMMUNITY DISORDER: ICD-10-CM

## 2025-04-28 DIAGNOSIS — R79.89 OTHER SPECIFIED ABNORMAL FINDINGS OF BLOOD CHEMISTRY: ICD-10-CM

## 2025-04-28 DIAGNOSIS — Z79.899 ENCOUNTER FOR LONG-TERM (CURRENT) USE OF MEDICATIONS: ICD-10-CM

## 2025-04-28 DIAGNOSIS — Z13.1 SCREENING FOR DIABETES MELLITUS: Primary | ICD-10-CM

## 2025-04-28 DIAGNOSIS — R73.09 OTHER ABNORMAL GLUCOSE: ICD-10-CM

## 2025-04-28 DIAGNOSIS — Z13.1 SCREENING FOR DIABETES MELLITUS: ICD-10-CM

## 2025-04-28 LAB
25(OH)D3 SERPL-MCNC: 34.7 NG/ML (ref 30–100)
ALBUMIN SERPL-MCNC: 3.6 G/DL (ref 3.5–5)
ALBUMIN/GLOB SERPL: 1.2 (ref 1–1.9)
ALP SERPL-CCNC: 108 U/L (ref 35–104)
ALT SERPL-CCNC: 18 U/L (ref 8–45)
ANION GAP SERPL CALC-SCNC: 11 MMOL/L (ref 7–16)
AST SERPL-CCNC: 18 U/L (ref 15–37)
BASOPHILS # BLD: 0.08 K/UL (ref 0–0.2)
BASOPHILS NFR BLD: 0.9 % (ref 0–2)
BILIRUB SERPL-MCNC: 0.3 MG/DL (ref 0–1.2)
BUN SERPL-MCNC: 15 MG/DL (ref 6–23)
CALCIUM SERPL-MCNC: 8.9 MG/DL (ref 8.8–10.2)
CHLORIDE SERPL-SCNC: 103 MMOL/L (ref 98–107)
CHOLEST SERPL-MCNC: 141 MG/DL (ref 0–200)
CO2 SERPL-SCNC: 25 MMOL/L (ref 20–29)
CREAT SERPL-MCNC: 0.77 MG/DL (ref 0.6–1.1)
DIFFERENTIAL METHOD BLD: ABNORMAL
EOSINOPHIL # BLD: 0 K/UL (ref 0–0.8)
EOSINOPHIL NFR BLD: 0 % (ref 0.5–7.8)
ERYTHROCYTE [DISTWIDTH] IN BLOOD BY AUTOMATED COUNT: 13.6 % (ref 11.9–14.6)
EST. AVERAGE GLUCOSE BLD GHB EST-MCNC: 138 MG/DL
GLOBULIN SER CALC-MCNC: 3 G/DL (ref 2.3–3.5)
GLUCOSE SERPL-MCNC: 126 MG/DL (ref 70–99)
HBA1C MFR BLD: 6.4 % (ref 0–5.6)
HCT VFR BLD AUTO: 46.4 % (ref 35.8–46.3)
HDLC SERPL-MCNC: 28 MG/DL (ref 40–60)
HDLC SERPL: 5 (ref 0–5)
HGB BLD-MCNC: 14.4 G/DL (ref 11.7–15.4)
IMM GRANULOCYTES # BLD AUTO: 0.03 K/UL (ref 0–0.5)
IMM GRANULOCYTES NFR BLD AUTO: 0.3 % (ref 0–5)
LDLC SERPL CALC-MCNC: 46 MG/DL (ref 0–100)
LYMPHOCYTES # BLD: 2.48 K/UL (ref 0.5–4.6)
LYMPHOCYTES NFR BLD: 26.6 % (ref 13–44)
MCH RBC QN AUTO: 29.7 PG (ref 26.1–32.9)
MCHC RBC AUTO-ENTMCNC: 31 G/DL (ref 31.4–35)
MCV RBC AUTO: 95.7 FL (ref 82–102)
MONOCYTES # BLD: 0.61 K/UL (ref 0.1–1.3)
MONOCYTES NFR BLD: 6.5 % (ref 4–12)
NEUTS SEG # BLD: 6.14 K/UL (ref 1.7–8.2)
NEUTS SEG NFR BLD: 65.7 % (ref 43–78)
NRBC # BLD: 0 K/UL (ref 0–0.2)
PLATELET # BLD AUTO: 182 K/UL (ref 150–450)
PMV BLD AUTO: 10.9 FL (ref 9.4–12.3)
POTASSIUM SERPL-SCNC: 4.4 MMOL/L (ref 3.5–5.1)
PROT SERPL-MCNC: 6.5 G/DL (ref 6.3–8.2)
RBC # BLD AUTO: 4.85 M/UL (ref 4.05–5.2)
SODIUM SERPL-SCNC: 139 MMOL/L (ref 136–145)
T4 FREE SERPL-MCNC: 0.9 NG/DL (ref 0.9–1.7)
TRIGL SERPL-MCNC: 334 MG/DL (ref 0–150)
TSH W FREE THYROID IF ABNORMAL: 8.06 UIU/ML (ref 0.27–4.2)
VIT B12 SERPL-MCNC: 370 PG/ML (ref 193–986)
VLDLC SERPL CALC-MCNC: 67 MG/DL (ref 6–23)
WBC # BLD AUTO: 9.3 K/UL (ref 4.3–11.1)

## 2025-04-29 ENCOUNTER — RESULTS FOLLOW-UP (OUTPATIENT)
Dept: PRIMARY CARE CLINIC | Facility: CLINIC | Age: 55
End: 2025-04-29

## 2025-04-29 DIAGNOSIS — E78.2 MIXED HYPERLIPIDEMIA: ICD-10-CM

## 2025-04-29 DIAGNOSIS — E06.3 HYPOTHYROIDISM DUE TO HASHIMOTO'S THYROIDITIS: ICD-10-CM

## 2025-04-29 DIAGNOSIS — E11.9 TYPE 2 DIABETES MELLITUS WITHOUT COMPLICATION, WITHOUT LONG-TERM CURRENT USE OF INSULIN (HCC): Primary | ICD-10-CM

## 2025-04-29 NOTE — ASSESSMENT & PLAN NOTE
May stop fenofibrate  Re-start Vascepa 1 g - 2 caps BID  Re-start Atorvastatin 40 mg PO QD  --------------------------  04/28/2025 Labs:     Lipid Panel (Cholesterol/Triglycerides)  Triglycerides: 334 mg/dL (High; normal 0-150)  HDL (\"good\" cholesterol): 28 mg/dL (Low; normal 40-60)  VLDL: 67 mg/dL (High; normal 6-23)  ? Total cholesterol and LDL cholesterol are at goal.  Recommendations:  Continue atorvastatin 40 mg and Vascepa (icosapent ethyl).  Emphasize omega-3 rich foods (salmon, flaxseed, adrian seeds).  Natural options: Add fiber supplements (psyllium 5-10g daily) and ground flaxseed to lower triglycerides.  ------------------------------  Hyperlipidemia       Chronic. Worsening  Labs: at least annually. Recent labs reviewed.  Non-laboratory testing: None indicated today.  Medication:  See above  Discussed warning S&S r/t medication usage. Discussed SE, AR, AE.  F/u in 6 months for re-evaluation, unless an earlier f/u is required. If improvement is noted, we will provide additional refills.     Diet/Exercise/Lifestyle Changes  Encourage appropriate rest and fluid intake  Encourage lifestyle changes, including healthy eating, exercise, limiting alcohol/tobacco use, and stress reducers.  Dietary Considerations  Eat more grains (substitute quinoa or brown rice instead of potatoes or pasta)  Eat more nuts (unsalted)  Eat more omega 3 fats (2 servings per week)  [place], mackerel, herring, tuna, flax, walnuts, spinach, broccoli, edamame, omega supplements  Eat more applies, pears, oranges, and elizabeth  Use olive oil when possible  Olive oil is not good for high heat applications like frying  Try using canola oil with high heat applications  Can be mixed with red wine vinegar, minced garlic, and ground pepper to make salad dressing  Drizzle 3 tbsp olive oil over vegetables (carrots, leeks). Scatter on some herbs, cover with foil. Bake at 375 for 45 minutes.  Try different foods  Avocado    Tofu  Activity

## 2025-04-29 NOTE — ASSESSMENT & PLAN NOTE
On metformin 500 mg p.o. daily.  Recently started 03/2024  On semaglutide 0.25 mg p.o. weekly    --------------------------  04/28/2025 Labs:     Blood Sugar and Diabetes Control  Fasting Glucose: 126 mg/dL (High; normal 70-99)  Hemoglobin A1C: 6.4% (High; normal 0-5.6)  eAG (estimated average glucose): 138 mg/dL  Diagnosis: Type 2 Diabetes - continues to be active.  Recommendations:  Increase metformin dose to 500 mg twice daily if tolerated (discuss with provider).  Emphasize low-carb, Mediterranean-style diet.  Natural options: Consider cinnamon supplementation (500-1000 mg daily) and chromium picolinate (200-400 mcg daily) to assist glucose control (discuss before starting).  --------------------------------------------    DM Type II     Chronic.  Elevated on recent labs  Labs: A1C (as indicated) with annual labs. Recent labs reviewed.  Medication:  See above  Discussed warning S&S r/t medication usage. Discussed SE, AR, AE.  Tolerating medication well. No SE, AR, AE. Benefits outweigh risks. Prefers to continue medication as currently prescribed  Encourage appropriate rest and fluid intake  Encourage lifestyle changes, including healthy eating, exercise, limiting alcohol/tobacco use, and stress reducers.  Monitor BP and HR at home. Keep a log and bring to each visit.  Monitor BGL readings at home as directed (4x daily). Keep a log and bring to each visit.  F/u in 6 months for re-evaluation, unless an earlier f/u is required     General Measures:  Recommended diabetic foot exam at every visit.  Recommended annual diabetic eye exam  Monitor for control of cardiovascular risk factors including BP and lipids  Provided diabetes self-management education     Diabetes (Patient Education)        Weight loss through dietary modification can improve many aspects of type 2 diabetes including glycemic control and hypertension. The improvement in glycemic control is related to both the degree caloric restriction and weight

## 2025-04-29 NOTE — ASSESSMENT & PLAN NOTE
On levothyroxine 50 mcg daily     --------------------------  04/28/2025 Labs:     Thyroid Panel  TSH: 8.06 uIU/mL (High; normal 0.27-4.2)  Free T4: 0.9 ng/dL (Low-normal; normal 0.9-1.7)  Diagnosis: Hypothyroidism - undercontrolled.  Recommendations:  Increase levothyroxine dose from 50 mcg to 75 mcg daily.  Recheck TSH in 6-8 weeks.  Natural support: Ensure adequate selenium intake (200 mcg/day from supplement or Brazil nuts) to support thyroid function.  ------------------------------  Hypothyroidism       Stable/Well-Controlled  Denies symptoms     Patient Education:  Stressed the importance of compliance with thyroid replacement therapy  Explained the need for lifelong treatment  Educated on interactions with other medications  Instructed to report any signs of infection or heart problems  Encouraged patient to report any new symptoms or a change in symptoms  Discussed the sign of thyrotoxicity. Discussed warning S&S    High-bulk diet may help avoid constipation  Encourage diet, exercise, lifestyle changes     Medication Usage:  Levothyroxine should be taken on an empty stomach, ideally an hour before breakfast  Administering at bedtime may result in higher levels than administering in the morning  Medications that interfere with its absorption should be taken 4 hours after the dose: ferrous sulfate, PPI, calcium carbonate, bile acid resins

## 2025-05-05 ENCOUNTER — TELEMEDICINE (OUTPATIENT)
Dept: PRIMARY CARE CLINIC | Facility: CLINIC | Age: 55
End: 2025-05-05
Payer: MEDICARE

## 2025-05-05 DIAGNOSIS — M79.7 FIBROMYALGIA: ICD-10-CM

## 2025-05-05 DIAGNOSIS — J41.1 MUCOPURULENT CHRONIC BRONCHITIS (HCC): ICD-10-CM

## 2025-05-05 DIAGNOSIS — E78.2 MIXED HYPERLIPIDEMIA: ICD-10-CM

## 2025-05-05 DIAGNOSIS — E55.9 VITAMIN D DEFICIENCY: ICD-10-CM

## 2025-05-05 DIAGNOSIS — G89.29 CHRONIC BILATERAL LOW BACK PAIN, UNSPECIFIED WHETHER SCIATICA PRESENT: ICD-10-CM

## 2025-05-05 DIAGNOSIS — F51.01 PRIMARY INSOMNIA: ICD-10-CM

## 2025-05-05 DIAGNOSIS — E06.3 HYPOTHYROIDISM DUE TO HASHIMOTO'S THYROIDITIS: ICD-10-CM

## 2025-05-05 DIAGNOSIS — E78.2 ELEVATED TRIGLYCERIDES WITH HIGH CHOLESTEROL: ICD-10-CM

## 2025-05-05 DIAGNOSIS — K21.9 GASTROESOPHAGEAL REFLUX DISEASE WITHOUT ESOPHAGITIS: ICD-10-CM

## 2025-05-05 DIAGNOSIS — M54.50 CHRONIC BILATERAL LOW BACK PAIN, UNSPECIFIED WHETHER SCIATICA PRESENT: ICD-10-CM

## 2025-05-05 DIAGNOSIS — I10 ESSENTIAL HYPERTENSION: Primary | ICD-10-CM

## 2025-05-05 DIAGNOSIS — E11.9 TYPE 2 DIABETES MELLITUS WITHOUT COMPLICATION, WITHOUT LONG-TERM CURRENT USE OF INSULIN (HCC): ICD-10-CM

## 2025-05-05 PROCEDURE — G8427 DOCREV CUR MEDS BY ELIG CLIN: HCPCS | Performed by: NURSE PRACTITIONER

## 2025-05-05 PROCEDURE — 2022F DILAT RTA XM EVC RTNOPTHY: CPT | Performed by: NURSE PRACTITIONER

## 2025-05-05 PROCEDURE — 3017F COLORECTAL CA SCREEN DOC REV: CPT | Performed by: NURSE PRACTITIONER

## 2025-05-05 PROCEDURE — G2211 COMPLEX E/M VISIT ADD ON: HCPCS | Performed by: NURSE PRACTITIONER

## 2025-05-05 PROCEDURE — 99214 OFFICE O/P EST MOD 30 MIN: CPT | Performed by: NURSE PRACTITIONER

## 2025-05-05 PROCEDURE — 3044F HG A1C LEVEL LT 7.0%: CPT | Performed by: NURSE PRACTITIONER

## 2025-05-05 RX ORDER — ATORVASTATIN CALCIUM 40 MG/1
40 TABLET, FILM COATED ORAL DAILY
Qty: 90 TABLET | Refills: 1 | Status: SHIPPED | OUTPATIENT
Start: 2025-05-05

## 2025-05-05 RX ORDER — ZOLPIDEM TARTRATE 10 MG/1
10 TABLET ORAL NIGHTLY PRN
Qty: 30 TABLET | Refills: 2 | Status: SHIPPED | OUTPATIENT
Start: 2025-05-05 | End: 2025-11-01

## 2025-05-05 ASSESSMENT — ENCOUNTER SYMPTOMS
DIARRHEA: 0
RESPIRATORY NEGATIVE: 1
ALLERGIC/IMMUNOLOGIC NEGATIVE: 1
CONSTIPATION: 0
SHORTNESS OF BREATH: 0
CHEST TIGHTNESS: 0
VOMITING: 0
EYES NEGATIVE: 1
NAUSEA: 0
ABDOMINAL PAIN: 0

## 2025-05-05 NOTE — ASSESSMENT & PLAN NOTE
On lisinopril-HCTZ 10-12.5 daily  On atorvastatin 40 mg p.o. daily  On Vascepa 2 g p.o. twice daily     HTN    Chronic. Stable/Well-Controlled  Labs: at least annually. Recent labs reviewed.  Non-laboratory testing: ECG (as indicated, not performed today)  Medication:  See above  Medication and dosage is unchanged today.  Discussed warning S&S r/t medication usage. Discussed SE, AR, AE.  Tolerating medication well. No SE, AR, AE. Benefits outweigh risks. Prefers to continue medication as currently prescribed.  Encourage appropriate rest and fluid intake  Encourage lifestyle changes, including healthy eating, exercise, limiting alcohol/tobacco use, and stress reducers. DASH diet. Reduce sodium intake. Limit alcohol consumption to < 1 oz/day.  Monitor BP and HR at home. Keep a log and bring to each visit.  F/u in 6 m

## 2025-05-05 NOTE — ASSESSMENT & PLAN NOTE
On Ambien 10 mg PO Q HS    Referral to Sleep Medicine previously  Has tried Ramelteon, Doxepin, and Remeron w/o success     UDS completed on 03/15/2024, positive for THC.  UDS completed on 12/13/2024, lab unable to locate.     CS Use    Chronic.  Stable..  CSA: UTD. 12/13/2024  UDS: UTD. 12/13/2024  Medication: See above - 90 day refill given  Discussed warning S&S r/t medication usage. Discussed SE, AR, AE.  F/u in 3 months for re-evaluation, unless an earlier f/u is required. If improvement is noted, we will provide additional refills.  Referral: Previously performed.     INSOMNIA     Chronic.  Stable  Labs:  Recent labs reviewed.  Diagnostic Testing: May consider sleep study (if concerned about sleep apnea or periodic limb movement disorder)  Medication:  See above  Discussed warning S&S r/t medication usage. Discussed SE, AR, AE.  Contraindications/precautions are as follows for Nonbenzodiazepine hypnotics or Benzodiazepine hypnotics:  Not indicated for long-term treatment due to risks of tolerance, dependency, daytime attention and concentration compromise, incoordination, rebound insomnia  Long-acting benzodiazepines associated with higher incidence of daytime sedation and motor impairment  Avoid in elderly, pregnant, breastfeeding, substance abusers, and patients with suicidal or parasuicidal behaviors.  Avoid in patients with untreated obstructive apnea and chronic pulmonary disease.  Nonbenzodiazepine receptor agonists may occasionally induce parasomnias (sleepwalking, sleep eating, sleep driving).  Encourage appropriate rest and fluid intake  Encourage lifestyle changes, including healthy eating, exercise, limiting alcohol/tobacco use, and stress reducers.  F/u in 6 months for re-evaluation, unless an earlier f/u is required.      General Measures/Prevention:  Regular bed time and rise time  Having a consistent bedtime and rise time leads to more regular sleep schedules and avoids periods of sleep

## 2025-05-05 NOTE — ASSESSMENT & PLAN NOTE
On lisinopril-HCTZ 10-12.5 daily  On atorvastatin 40 mg p.o. daily  On Vascepa 2 g p.o. twice daily     On metformin 500 mg p.o. daily.   On semaglutide 0.50 mg p.o. weekly.  Increase to 1 mg weekly     --------------------------  04/28/2025 Labs:      Blood Sugar and Diabetes Control  Fasting Glucose: 126 mg/dL (High; normal 70-99)  Hemoglobin A1C: 6.4% (High; normal 0-5.6)  eAG (estimated average glucose): 138 mg/dL  Diagnosis: Type 2 Diabetes - continues to be active.  Recommendations:  Increase metformin dose to 500 mg twice daily if tolerated (discuss with provider).  Emphasize low-carb, Mediterranean-style diet.  Natural options: Consider cinnamon supplementation (500-1000 mg daily) and chromium picolinate (200-400 mcg daily) to assist glucose control (discuss before starting).  --------------------------------------------     DM Type II     Chronic.  Elevated on recent labs  Labs: A1C (as indicated) with annual labs. Recent labs reviewed.  Medication:  See above  Discussed warning S&S r/t medication usage. Discussed SE, AR, AE.  Tolerating medication well. No SE, AR, AE. Benefits outweigh risks. Prefers to continue medication as currently prescribed  Encourage appropriate rest and fluid intake  Encourage lifestyle changes, including healthy eating, exercise, limiting alcohol/tobacco use, and stress reducers.  Monitor BP and HR at home. Keep a log and bring to each visit.  Monitor BGL readings at home as directed (4x daily). Keep a log and bring to each visit.  F/u in 6 months for re-evaluation, unless an earlier f/u is required     General Measures:  Recommended diabetic foot exam at every visit.  Recommended annual diabetic eye exam  Monitor for control of cardiovascular risk factors including BP and lipids  Provided diabetes self-management education     Diabetes (Patient Education)        Weight loss through dietary modification can improve many aspects of type 2 diabetes including glycemic control and

## 2025-05-05 NOTE — ASSESSMENT & PLAN NOTE
On lisinopril-HCTZ 10-12.5 daily  On atorvastatin 40 mg p.o. daily  On Vascepa 2 g p.o. twice daily     On metformin 500 mg p.o. daily.   On semaglutide 0.50 mg p.o. weekly.  Increase to 1 mg weekly    -----------------------------    --------------------------  04/28/2025 Labs:      Lipid Panel (Cholesterol/Triglycerides)  Triglycerides: 334 mg/dL (High; normal 0-150)  HDL (\"good\" cholesterol): 28 mg/dL (Low; normal 40-60)  VLDL: 67 mg/dL (High; normal 6-23)  ? Total cholesterol and LDL cholesterol are at goal.  Recommendations:  Continue atorvastatin 40 mg and Vascepa (icosapent ethyl).  Emphasize omega-3 rich foods (salmon, flaxseed, adrian seeds).  Natural options: Add fiber supplements (psyllium 5-10g daily) and ground flaxseed to lower triglycerides.  ------------------------------  Hyperlipidemia        Chronic. Worsening  Labs: at least annually. Recent labs reviewed.  Non-laboratory testing: None indicated today.  Medication:  See above  Discussed warning S&S r/t medication usage. Discussed SE, AR, AE.  F/u in 6 months for re-evaluation, unless an earlier f/u is required. If improvement is noted, we will provide additional refills.     Diet/Exercise/Lifestyle Changes  Encourage appropriate rest and fluid intake  Encourage lifestyle changes, including healthy eating, exercise, limiting alcohol/tobacco use, and stress reducers.  Dietary Considerations  Eat more grains (substitute quinoa or brown rice instead of potatoes or pasta)  Eat more nuts (unsalted)  Eat more omega 3 fats (2 servings per week)  [place], mackerel, herring, tuna, flax, walnuts, spinach, broccoli, edamame, omega supplements  Eat more applies, pears, oranges, and elizabeth  Use olive oil when possible  Olive oil is not good for high heat applications like frying  Try using canola oil with high heat applications  Can be mixed with red wine vinegar, minced garlic, and ground pepper to make salad dressing  Drizzle 3 tbsp olive oil over vegetables

## 2025-05-05 NOTE — ASSESSMENT & PLAN NOTE
On levothyroxine 50 mcg daily     Stable. Chronic  Continue current tx plan     --------------------------  04/28/2025 Labs:      Thyroid Panel  TSH: 8.06 uIU/mL (High; normal 0.27-4.2)  Free T4: 0.9 ng/dL (Low-normal; normal 0.9-1.7)  Diagnosis: Hypothyroidism - undercontrolled.  Recommendations:  Increase levothyroxine dose from 50 mcg to 75 mcg daily.  Recheck TSH in 6-8 weeks.  Natural support: Ensure adequate selenium intake (200 mcg/day from supplement or [place] nuts) to support thyroid function.  ------------------------------  Hypothyroidism        Stable/Well-Controlled  Denies symptoms     Patient Education:  Stressed the importance of compliance with thyroid replacement therapy  Explained the need for lifelong treatment  Educated on interactions with other medications  Instructed to report any signs of infection or heart problems  Encouraged patient to report any new symptoms or a change in symptoms  Discussed the sign of thyrotoxicity. Discussed warning S&S     High-bulk diet may help avoid constipation  Encourage diet, exercise, lifestyle changes     Medication Usage:  Levothyroxine should be taken on an empty stomach, ideally an hour before breakfast  Administering at bedtime may result in higher levels than administering in the morning  Medications that interfere with its absorption should be taken 4 hours after the dose: ferrous sulfate, PPI, calcium carbonate, bile acid resins

## 2025-05-05 NOTE — ASSESSMENT & PLAN NOTE
Stable.  Chronic.  Does not see specialist  On albuterol inhaler as needed  No longer on Advair 250-50 mcg daily

## 2025-05-05 NOTE — PROGRESS NOTES
2025    TELEHEALTH EVALUATION -- Audio/Visual    History of Present Illness  The patient had a virtual visit to discuss her hypothyroidism and type 2 diabetes.    Hypothyroidism  - She has been taking levothyroxine first thing in the morning on an empty stomach.  - Her recent lab results show that her TSH is slightly elevated at 8.06, up from a little over six previously.  - She prefers not to increase her levothyroxine dosage to 75 mcg today.  - Her vitamin B12 and vitamin D levels are normal, with vitamin D at 34.8.    Type 2 Diabetes  - She wants to increase her Ozempic dosage from 0.5 mg to help with both weight loss and blood sugar control.  - She mentioned that she has lost 15 pounds thanks to the medication.  - Initially, Ozempic made her feel full after eating just a little, but that effect has lessened over time.  - Her A1c has improved significantly from 7.3 to 6.4.    Supplemental information: Other lab results show mild abnormalities in her CBC, a slight elevation in alkaline phosphatase, and normal levels for electrolytes, kidney function, liver enzymes, and protein. Her triglycerides are high at 334, and her HDL is low at 28. She is currently taking several medications, including Vascepa, lisinopril-HCTZ, levothyroxine, albuterol, pregabalin, zolpidem, metformin, atorvastatin, and vitamin D 50,000 units weekly. She plans to send in or bring in her FIT test next week.    Micah Hopper (:  1970) has requested an audio/video evaluation for the following concern(s):    Chief Complaint   Patient presents with    Discuss Labs    Medication Refill       Review of Systems   Constitutional:  Positive for fatigue. Negative for activity change, appetite change and fever.   HENT: Negative.     Eyes: Negative.    Respiratory: Negative.  Negative for chest tightness and shortness of breath.    Cardiovascular:  Negative for chest pain and palpitations.   Gastrointestinal:  Negative for abdominal

## 2025-05-08 ENCOUNTER — TELEPHONE (OUTPATIENT)
Dept: PRIMARY CARE CLINIC | Facility: CLINIC | Age: 55
End: 2025-05-08
Payer: MEDICARE

## 2025-05-08 DIAGNOSIS — Z12.11 COLON CANCER SCREENING: Primary | ICD-10-CM

## 2025-05-08 LAB
HEMOCCULT STL QL: NEGATIVE
VALID INTERNAL CONTROL: NORMAL

## 2025-05-08 PROCEDURE — G0328 FECAL BLOOD SCRN IMMUNOASSAY: HCPCS | Performed by: NURSE PRACTITIONER

## 2025-06-12 DIAGNOSIS — E55.9 VITAMIN D DEFICIENCY: ICD-10-CM

## 2025-06-12 RX ORDER — ERGOCALCIFEROL 1.25 MG/1
50000 CAPSULE, LIQUID FILLED ORAL WEEKLY
Qty: 12 CAPSULE | Refills: 1 | Status: SHIPPED | OUTPATIENT
Start: 2025-06-12

## 2025-08-05 ENCOUNTER — TELEMEDICINE (OUTPATIENT)
Dept: PRIMARY CARE CLINIC | Facility: CLINIC | Age: 55
End: 2025-08-05
Payer: MEDICARE

## 2025-08-05 DIAGNOSIS — E78.2 ELEVATED TRIGLYCERIDES WITH HIGH CHOLESTEROL: ICD-10-CM

## 2025-08-05 DIAGNOSIS — I10 ESSENTIAL HYPERTENSION: ICD-10-CM

## 2025-08-05 DIAGNOSIS — E78.2 MIXED HYPERLIPIDEMIA: ICD-10-CM

## 2025-08-05 DIAGNOSIS — E06.3 HYPOTHYROIDISM DUE TO HASHIMOTO'S THYROIDITIS: ICD-10-CM

## 2025-08-05 DIAGNOSIS — K21.9 GASTROESOPHAGEAL REFLUX DISEASE WITHOUT ESOPHAGITIS: ICD-10-CM

## 2025-08-05 DIAGNOSIS — F51.01 PRIMARY INSOMNIA: ICD-10-CM

## 2025-08-05 PROCEDURE — 99214 OFFICE O/P EST MOD 30 MIN: CPT | Performed by: NURSE PRACTITIONER

## 2025-08-05 PROCEDURE — G8427 DOCREV CUR MEDS BY ELIG CLIN: HCPCS | Performed by: NURSE PRACTITIONER

## 2025-08-05 PROCEDURE — 3017F COLORECTAL CA SCREEN DOC REV: CPT | Performed by: NURSE PRACTITIONER

## 2025-08-05 RX ORDER — ICOSAPENT ETHYL 1 G/1
2 CAPSULE ORAL 2 TIMES DAILY
Qty: 360 CAPSULE | Refills: 2 | Status: SHIPPED | OUTPATIENT
Start: 2025-08-05 | End: 2026-02-01

## 2025-08-05 RX ORDER — OMEPRAZOLE 40 MG/1
40 CAPSULE, DELAYED RELEASE ORAL DAILY
Qty: 90 CAPSULE | Refills: 1 | Status: CANCELLED | OUTPATIENT
Start: 2025-08-05

## 2025-08-05 RX ORDER — ZOLPIDEM TARTRATE 10 MG/1
10 TABLET ORAL NIGHTLY PRN
Qty: 30 TABLET | Refills: 2 | Status: SHIPPED | OUTPATIENT
Start: 2025-08-05 | End: 2026-02-01

## 2025-08-05 RX ORDER — LISINOPRIL AND HYDROCHLOROTHIAZIDE 10; 12.5 MG/1; MG/1
1 TABLET ORAL DAILY
Qty: 90 TABLET | Refills: 1 | Status: SHIPPED | OUTPATIENT
Start: 2025-08-05 | End: 2026-02-01

## 2025-08-05 RX ORDER — LEVOTHYROXINE SODIUM 50 UG/1
50 TABLET ORAL DAILY
Qty: 90 TABLET | Refills: 1 | Status: SHIPPED | OUTPATIENT
Start: 2025-08-05

## 2025-08-05 ASSESSMENT — ENCOUNTER SYMPTOMS
CHEST TIGHTNESS: 0
RESPIRATORY NEGATIVE: 1
CONSTIPATION: 0
DIARRHEA: 0
EYES NEGATIVE: 1
SHORTNESS OF BREATH: 0
NAUSEA: 0
ABDOMINAL PAIN: 0
VOMITING: 0
ALLERGIC/IMMUNOLOGIC NEGATIVE: 1

## (undated) DEVICE — SHEET, T, LAPAROTOMY, STERILE: Brand: MEDLINE

## (undated) DEVICE — Device

## (undated) DEVICE — INTENDED FOR TISSUE SEPARATION, AND OTHER PROCEDURES THAT REQUIRE A SHARP SURGICAL BLADE TO PUNCTURE OR CUT.: Brand: BARD-PARKER ® STAINLESS STEEL BLADES

## (undated) DEVICE — SUTURE MCRYL SZ 4-0 L27IN ABSRB UD L19MM PS-2 1/2 CIR PRIM Y426H

## (undated) DEVICE — DISPOSABLE TOURNIQUET CUFF SINGLE BLADDER, DUAL PORT AND QUICK CONNECT CONNECTOR: Brand: COLOR CUFF

## (undated) DEVICE — SURGICAL PROCEDURE PACK BASIC ST FRANCIS

## (undated) DEVICE — 3M™ STERI-STRIP™ REINFORCED ADHESIVE SKIN CLOSURES, R1547, 1/2 IN X 4 IN (12 MM X 100 MM), 6 STRIPS/ENVELOPE: Brand: 3M™ STERI-STRIP™

## (undated) DEVICE — SUTURE PDS II SZ 1 L27IN ABSRB VLT CT-1 L36MM 1/2 CIR Z341H

## (undated) DEVICE — REM POLYHESIVE ADULT PATIENT RETURN ELECTRODE: Brand: VALLEYLAB

## (undated) DEVICE — SUTURE MCRYL SZ 3-0 L27IN ABSRB UD L19MM PS-2 3/8 CIR PRIM Y427H

## (undated) DEVICE — PADDING CAST W4INXL4YD ST COT COHESIVE HND TEARABLE SPEC

## (undated) DEVICE — MASTISOL ADHESIVE LIQ 2/3ML

## (undated) DEVICE — SOLUTION IV 1000ML 0.9% SOD CHL

## (undated) DEVICE — DRAPE, FILM SHEET, 44X65 STERILE: Brand: MEDLINE

## (undated) DEVICE — DISPOSABLE BIPOLAR CODE, 12' (3.66 M): Brand: CONMED

## (undated) DEVICE — STRETCH BANDAGE ROLL: Brand: DERMACEA

## (undated) DEVICE — (D)PREP SKN CHLRAPRP APPL 26ML -- CONVERT TO ITEM 371833

## (undated) DEVICE — SUTURE VCRL SZ 0 L27IN ABSRB VLT L26MM CT-2 1/2 CIR J334H

## (undated) DEVICE — GOWN,REINFORCED,POLY,AURORA,XXLARGE,STR: Brand: MEDLINE

## (undated) DEVICE — BUTTON SWITCH PENCIL BLADE ELECTRODE, HOLSTER: Brand: EDGE

## (undated) DEVICE — SUTURE VCRL SZ 3-0 L18IN ABSRB UD PS-2 L19MM 1/2 CIR J497G

## (undated) DEVICE — BNDG,ELSTC,MATRIX,STRL,6"X5YD,LF,HOOK&LP: Brand: MEDLINE

## (undated) DEVICE — SUTURE STRATAFIX SPRL MCRYL + SZ 4-0 L12IN ABSRB UD PS-2 SXMP1B117

## (undated) DEVICE — SUTURE VCRL SZ 3-0 L27IN ABSRB UD L17MM RB-1 1/2 CIR J215H

## (undated) DEVICE — SYR 10ML LUER LOK 1/5ML GRAD --

## (undated) DEVICE — SUTURE VCRL SZ 3-0 L27IN ABSRB UD L26MM SH 1/2 CIR J416H

## (undated) DEVICE — DISPOSABLE KIT FOR 1.8 MM Q-FIX                                    IMPLANT, INCLUDES DRILL, DRILL GUIDE                                    AND OBTURATOR: Brand: Q-FIX

## (undated) DEVICE — APPLICATOR BNDG 1MM ADH PREMIERPRO EXOFIN

## (undated) DEVICE — DERMABOND SKIN ADH 0.7ML -- DERMABOND ADVANCED 12/BX

## (undated) DEVICE — STANDARD HYPODERMIC NEEDLE,POLYPROPYLENE HUB: Brand: MONOJECT

## (undated) DEVICE — DRAPE,HAND,STERILE: Brand: MEDLINE

## (undated) DEVICE — NEEDLE HYPO 25GA L1.5IN BLU POLYPR HUB S STL REG BVL STR

## (undated) DEVICE — KENDALL SCD EXPRESS SLEEVES, KNEE LENGTH, LARGE: Brand: KENDALL SCD

## (undated) DEVICE — TOPAZ EZ MICRODEBRIDER COBLATION WAND WITH INTEGRATED FINGER SWITCH IFS: Brand: COBLATION

## (undated) DEVICE — INTENT TO BE USED WITH SUTURE MATERIAL FOR TISSUE CLOSURE: Brand: RICHARD-ALLAN®  NEEDLE 1/2 CIRCLE REVERSE CUTTING

## (undated) DEVICE — STRIP,CLOSURE,WOUND,MEDI-STRIP,1/2X4: Brand: MEDLINE

## (undated) DEVICE — COTTON UNDERCAST PADDING,REGULAR FINISH: Brand: WEBRIL